# Patient Record
Sex: FEMALE | Race: WHITE | NOT HISPANIC OR LATINO | Employment: OTHER | URBAN - METROPOLITAN AREA
[De-identification: names, ages, dates, MRNs, and addresses within clinical notes are randomized per-mention and may not be internally consistent; named-entity substitution may affect disease eponyms.]

---

## 2018-07-16 ENCOUNTER — HOSPITAL ENCOUNTER (OUTPATIENT)
Dept: RADIOLOGY | Facility: HOSPITAL | Age: 63
Discharge: HOME | End: 2018-07-16
Attending: PHYSICIAN ASSISTANT
Payer: MEDICAID

## 2018-07-16 DIAGNOSIS — S52.501A DISTAL RADIUS FRACTURE, RIGHT: Primary | ICD-10-CM

## 2018-07-16 DIAGNOSIS — M79.604 RIGHT LEG PAIN: ICD-10-CM

## 2018-07-16 DIAGNOSIS — M67.01 CONTRACTURE OF RIGHT ACHILLES TENDON: Primary | ICD-10-CM

## 2018-07-16 DIAGNOSIS — M79.605 LEFT LEG PAIN: ICD-10-CM

## 2018-07-16 DIAGNOSIS — R20.0 NUMBNESS OF RIGHT LOWER EXTREMITY: ICD-10-CM

## 2018-07-16 DIAGNOSIS — M79.661 RIGHT CALF PAIN: ICD-10-CM

## 2018-07-16 DIAGNOSIS — M17.0 PRIMARY OSTEOARTHRITIS OF BOTH KNEES: ICD-10-CM

## 2018-07-16 DIAGNOSIS — S52.501A DISTAL RADIUS FRACTURE, RIGHT: ICD-10-CM

## 2018-07-16 PROCEDURE — 93970 EXTREMITY STUDY: CPT | Mod: 26,,, | Performed by: RADIOLOGY

## 2018-07-16 PROCEDURE — 93970 EXTREMITY STUDY: CPT | Mod: TC

## 2018-08-14 ENCOUNTER — CLINICAL SUPPORT (OUTPATIENT)
Dept: REHABILITATION | Facility: HOSPITAL | Age: 63
End: 2018-08-14
Payer: MEDICAID

## 2018-08-14 DIAGNOSIS — M25.561 CHRONIC PAIN OF BOTH KNEES: ICD-10-CM

## 2018-08-14 DIAGNOSIS — G89.29 CHRONIC PAIN OF BOTH KNEES: ICD-10-CM

## 2018-08-14 DIAGNOSIS — M25.562 CHRONIC PAIN OF BOTH KNEES: ICD-10-CM

## 2018-08-14 DIAGNOSIS — Z74.09 DECREASED MOBILITY AND ENDURANCE: ICD-10-CM

## 2018-08-14 DIAGNOSIS — M62.81 DECREASED MUSCLE STRENGTH: ICD-10-CM

## 2018-08-14 PROCEDURE — 97161 PT EVAL LOW COMPLEX 20 MIN: CPT | Mod: PN

## 2018-08-14 PROCEDURE — 97110 THERAPEUTIC EXERCISES: CPT | Mod: PN

## 2018-08-14 NOTE — PLAN OF CARE
TIME RECORD    Date: 8/14/2018    Start Time:  10:15  Stop Time:  11:00    PROCEDURES:    TIMED  Procedure Min.   TA 10                     UNTIMED  Procedure Min.   IE 35         Total Timed Minutes:  10  Total Timed Units:  1 TA  Total Untimed Units:  1 LCE  Charges Billed/# of units:  1 TE + 1 LCE  [facility]    PHYSICAL THERAPY EVALUATION  Onset Date: 2016  Medical Diagnosis:   M67.01 (ICD-10-CM) - Contracture of right Achilles tendon   R20.0 (ICD-10-CM) - Numbness of right lower extremity   M17.0 (ICD-10-CM) - Primary osteoarthritis of both knees   M79.661 (ICD-10-CM) - Right calf pain     Treatment Diagnosis:   1. Decreased muscle strength     2. Decreased mobility and endurance     3. Chronic pain of both knees       Physician: Park Wayne PA-C  No past medical history on file.  Precautions: R radial fracture in 2015, HTN  Prior Therapy: Yes for R hip pain  Medications: Mali Dias currently has no medications in their medication list.  Nutrition:  Obese  History of Present Illness: See subjective below  Prior Level of Function: Independent  Social History: No since 2010  Place of Residence (Steps/Adaptations): step down into dinning room for [facility]  Functional Deficits Leading to Referral/Nature of Injury: putting shoe on R foot, lifting/carrying, prolonged standing/walking, squatting  Patient Therapy Goals: Not have pain in my legs    Subjective     Mali Dias states pt reports that she is here for R leg and B knee pain in 2015 that got much better with therapy, but couldn't keep up with her HEP long term. Pt reports that her legs are very weak and painful.  Pain has been getting worse, and increases with weight bearing activities mainly with R medial calf and ankle pain. Pt reports her worse pain in her L knee, L hip and R medial ankle/calf. She also complains of numbness at times and sharp pain in her medial L ankle and calf that is off and on, and does not know what activity  causes it. Pain is usually better in the morning, but denies that pain gets worse in weightbearing activities. She says that when she stands up her R foot likes to turn in and has to put weight throughout it to straighten it out. She has received tests that rules out DVT.    Pain:  Location: L lateral/anterior hip, L anterior knee, L medial ankle rising proximally to L medial calf, and R knee pain   Description: Burning, Numb and Sharp  Activities Which Increase Pain: Sitting, Standing, Bending and Walking  Activities Which Decrease Pain: pain medication and rest  Pain Scale: 0/10 at best 8/10 now  10/10 at worst    Objective     Posture: anteriorly pelvic tilit, increased lumbar lordosis, min increased thoracic kyphosis, pes planus B  Palpation: +TTP at R lateral knee joint line, R medial calf, and L medial knee joint line  Sensation: light touch intact  DTRs: 3+ L4 B, 3+ S1 B  Clonus: 1 beat on R, normal on L  Range of Motion/Strength:   * = pain with testing  AROM: LUMBAR   Flexion 80%   Extension 80%   Right side bending 80%   Left side bending 80%   Right rotation 100%   Left rotation 100%     Hip  Right   Left  Pain/Dysfunction with Movement    AROM PROM MMT AROM PROM MMT    Flexion WFL WFL 4-/5 WFL WFL 4/5    Extension WFL WFL 2+/5 WFL WFL 2+/5    Abduction WFL WFL 3+/5 WFL WFL 4-/5    Adduction WFL WFL 4/5 WFL WFL 4/5    Internal rotation WFL WFL 4-/5 WFL WFL 4-/5    External rotation WFL WFL 4-/5 WFL WFL 4-/5       Knee  Right   Left  Pain/Dysfunction with Movement    AROM PROM MMT AROM PROM MMT    Flexion WFL WFL 4/5 WFL WFL 4+/5    Extension WFL WFL 4/5 WFL WFL 4/5      Ankle  Right   Left  Pain/Dysfunction with Movement    AROM PROM MMT AROM PROM MMT    Plantarflexion WFL WFL 4/5 WFL WFL 4/5    Dorsiflexion WFL WFL 4/5 WFL WFL 4/5    Inversion WFL WFL 4/5 WFL WFL 4/5    Eversion 75% WFL 4-/5 WFL WFL 4/5      Flexibility: decreased B HS length  Gait: With AD.  Device Used -  Cane  Analysis: decreased R  DF, decreased abhijeet, forward lean, decreased L step length  Bed Mobility:Independent  Transfers: Independent  Special Tests:   FADIR = positive on L, negative on R  LEONOR = negative on L with lateral hip pain down to knee, negative on L  Scour Test = negative B  Trendelenburg Sign = NT  Single Leg Bridge Test = NT  Anterior Drawer Test = negative B  Interdigital Neuroma Test = NT  Cummings test = negative B  Talar Tilit Test = NT  Navicular drop test = NT   (Difference of >10 mm is considered significant excessive foot pronation.)  Tinel's Sign Test (tarsal tunnel syndrome) = negative B  Jhoana's Test = NT  Mj's Compression Test [(+) B for pain increasing with knee extension] = NT  Edinson's Sign = NT  Patellar Grind Test = NT  Knee Varus Stress Test = negative B  Knee Valgus Stress Test = negative B  Lachman Drawer Test = NT  Anterior Drawer Test = NT  Pivot Shift test = NT  Shailesh's Test = NT  Apley's Test = NT  Severo Test = NT   J-Sign = NT    CMS Impairment/Limitation/Restriction for FOTO Lower Leg (w/o knee)Survey    Therapist reviewed FOTO scores for Mali Dias on 8/14/2018.   FOTO documents entered into [facility] - see Media section.    Limitation Score: 76%  Category: Mobility  Predicted: 59%       TREATMENT     Time In: 10:52  Time Out: 11:00    PT Evaluation Completed? Yes  Discussed Plan of Care with patient: Yes    Mali received 8 minutes of therapeutic activities & education including: role of PT and pt, prognosis, diagnosis, safe mobility with AD, possible causes to her problems, and importance of HEP.    GIVE HEP NEXT.  DATE 8/14/2018   VISIT 1   POC 10/14/18    G CODE 1/10   FOTO 1/5   Bike Next    MHP    MT    Stretches:    HS stretch    IT band stretch    Supine:    Quad sets Next B   SLR Next    SAQ    Knee flexion stretch    Heel slides    SL hip abd Next    Clams    Hip add Next    Bridges Next        Prone:    Quad stretch    HS curls        Seated:    LAQ Next    Marching Next        Standing:     Heel raises Next    Calf stretch on fitter Next    Steamboats Next    TKEs    SLS    Tandem walking    Cybex HS curls    Cybex LAQ    Leg Press Next    CP    INITIALS DAMARIS     Written Home Exercises Provided: Olga Lidia elder good understanding of the education provided.     See EMR in Patient Instructions for HEP given: not today      Assessment       Initial Assessment (Pertinent finding, problem list and factors affecting outcome):   Pt is a 62 yo female dx with Contracture of R achilles tendon, numbnes of RLE, Primary OA of B knees, and R calf pain presenting to PT at [facility]. Pt currently presents with B knee, L hip, and R medial calf pain, decreased lumbar ROM, decreased R ankle ROM, decreased BLE strength, poor posture, impaired balance and gait, and functional deficits with community walking and general ADLs.. Pt would benefit from skilled PT consisting of gait training, muscular skeletal stretching/strengthening, manual therapy, neuro muscular re-education, and modalities prn to address limitations and increase functional mobility.      History  Co-morbidities and personal factors that may impact the plan of care Co-morbidities:   arthritis, asthma, BMI over 30, HTN      Personal Factors:   no deficits     moderate   Examination  Body Structures and Functions, activity limitations and participation restrictions that may impact the plan of care Body Regions:   back  lower extremities  trunk    Body Systems:    gross symmetry  ROM  strength  gross coordinated movement  balance  gait  transfers  transitions  motor control    Participation Restrictions:   Community walking and standing activities    Activity limitations:   Learning and applying knowledge  no deficits    General Tasks and Commands  no deficits    Communication  no deficits    Mobility  lifting and carrying objects  walking    Self care  no deficits    Domestic Life  shopping  cooking  doing house work (cleaning  house, washing dishes, laundry)  assisting others    Interactions/Relationships  no deficits    Life Areas  no deficits    Community and Social Life  no deficits         high   Clinical Presentation stable and uncomplicated low   Decision Making/ Complexity Score: low       Rehab Potiential: excellent  Spiritual/Cultural Needs: None  Barriers to Rehab: None  GOALS: Short Term Goals:  4 weeks  1. Pt will demo good seated and standing posture.  2. Increase strength by 1/3 MMT grade in BLE  to increase tolerance for ADL and work activities.  3. Pt to tolerate HEP to improve ROM and independence with ADL's.    Long Term Goals: 8 weeks  1.Report decreased B knee, L hip, and R calf pain </= 3/10  to increase tolerance for ADLs.  2.Patient goal: Not have pain in my legs  3.Increase strength to >/= 4+/5 in BLE to increase tolerance for ADL and work activities.  4. Pt will report at </=59% impaired on KNEE FOTO to demonstrate increase in LE function with every day tasks.   5. Pt will negotiate 8 steps and ambulate community distances at >/= Mod I for increased functional mobility.  6. Increase knee and lumbar ROM to WNL in order to be able to perform ADLs without difficulty.  7. Pt will ambulate with min gait deviations and without AD at home for increased functional mobility.      Plan     Certification Period: 8/14/18 to 10/14/18  Recommended Treatment Plan: 2 times per week for 8 weeks: Aquatic Therapy, Cervical/Lumbar Traction, Electrical Stimulation IFC/Russian, Gait Training, Manual Therapy, Moist Heat/ Ice, Neuromuscular Re-ed, Orthotic Management and Training, Patient Education, Self Care, Therapeutic Activites and Therapeutic Exercise  Other Recommendations: modalities prn, ASTYM prn, kinesiotape prn, Functional Dry Needling prn       Cristobal Salamanca, PT  8/14/2018      I CERTIFY THE NEED FOR THESE SERVICES FURNISHED UNDER THIS PLAN OF TREATMENT AND WHILE UNDER MY CARE    Physician's comments:  ________________________________________________________________________________________________________________________________________________      Physician's Name: ___________________________________

## 2018-08-15 PROBLEM — Z74.09 DECREASED MOBILITY AND ENDURANCE: Status: ACTIVE | Noted: 2018-08-15

## 2018-08-15 PROBLEM — G89.29 CHRONIC PAIN OF BOTH KNEES: Status: ACTIVE | Noted: 2018-08-15

## 2018-08-15 PROBLEM — M25.561 CHRONIC PAIN OF BOTH KNEES: Status: ACTIVE | Noted: 2018-08-15

## 2018-08-15 PROBLEM — M25.562 CHRONIC PAIN OF BOTH KNEES: Status: ACTIVE | Noted: 2018-08-15

## 2018-08-15 PROBLEM — M62.81 DECREASED MUSCLE STRENGTH: Status: ACTIVE | Noted: 2018-08-15

## 2018-09-05 ENCOUNTER — DOCUMENTATION ONLY (OUTPATIENT)
Dept: REHABILITATION | Facility: HOSPITAL | Age: 63
End: 2018-09-05

## 2018-09-05 ENCOUNTER — CLINICAL SUPPORT (OUTPATIENT)
Dept: REHABILITATION | Facility: HOSPITAL | Age: 63
End: 2018-09-05
Payer: MEDICAID

## 2018-09-05 DIAGNOSIS — M25.561 CHRONIC PAIN OF BOTH KNEES: ICD-10-CM

## 2018-09-05 DIAGNOSIS — Z74.09 DECREASED MOBILITY AND ENDURANCE: ICD-10-CM

## 2018-09-05 DIAGNOSIS — M62.81 DECREASED MUSCLE STRENGTH: ICD-10-CM

## 2018-09-05 DIAGNOSIS — G89.29 CHRONIC PAIN OF BOTH KNEES: ICD-10-CM

## 2018-09-05 DIAGNOSIS — M25.562 CHRONIC PAIN OF BOTH KNEES: ICD-10-CM

## 2018-09-05 PROCEDURE — 97140 MANUAL THERAPY 1/> REGIONS: CPT | Mod: PN

## 2018-09-05 PROCEDURE — 97110 THERAPEUTIC EXERCISES: CPT | Mod: PN

## 2018-09-05 NOTE — PROGRESS NOTES
DAILY TREATMENT NOTE    DATE: 9/5/2018    Start Time:  8:00  Stop Time:  9:00    PROCEDURES:    TIMED  Procedure Min.   TE 20   MT 10                 UNTIMED  Procedure Min.   Bike 5   Supervised TE 25     Total Timed Minutes: 30  Total Timed Units:  2  Total Untimed Units:  0  Charges Billed/# of units:  2 TE      Progress/Current Status    Subjective:     Patient ID: Mali Dias is a 63 y.o. female.  Diagnosis:   1. Decreased muscle strength     2. Decreased mobility and endurance     3. Chronic pain of both knees       Pain: 7 /10  Pt reports L medial knee is where most of her pain is along with some general anterior R knee pain. No numbness in L leg, just L knee stiffness.    Objective:     Pt performed bike x 5 mins, and then supervised TE x 25 mins per log below. Pt then received MT x 10 mins consisting B patellar mobs Grade III and Long axis hip distraction B Grade III. Pt then performed TE x 20 mins per log below 1:1 with PT.    DATE 9/5/18 8/14/2018   VISIT 2 1   POC 10/14/18      G CODE 2/10 1/10   FOTO 2/5 1/5   Bike 5' Next    MHP      MT      Stretches:      HS stretch 3x30'' B     IT band stretch      Supine:      Quad sets 2x10 5'' holds Next B   SLR 2x10 B Next    SAQ 2x15 B  D/c next     Knee flexion stretch      Heel slides      SL hip abd 2x10 B Next    Clams Next      Hip add 15x5'' w/ ball Next    Bridges 2x10 Next           Prone:      Quad stretch      HS curls             Seated:      LAQ 2x10 B Next    Marching 2x10 B Next           Standing:      Heel raises 2x10 DL Next    Calf stretch on fitter 3x30'' DL Next    Steamboats 2x10 B Next    TKEs Next      Step ups 2x10 B L1  1 HR    Step downs     SLS      Tandem walking      Cybex HS curls      Cybex LAQ      Leg Press  Next    CP      INITIALS DAMARIS DAMARIS       Assessment:     Pt performed all TE well with only min increased B knee pain at times. Pt reported some medial lower left leg pain initially during step ups tat did not increase. Pt  very TTP at medial L knee, distal hip adductors, L distal IT band, and R lateral quad. Pt given HEP and pt scheduled more visits today. Pt demo increased fatigue after session today.    Patient Education/Response:     Cont HEP    Plans and Goals:     Cont per POC, progress per pt tolerance.    GOALS: Short Term Goals:  4 weeks  1. Pt will demo good seated and standing posture.  2. Increase strength by 1/3 MMT grade in BLE  to increase tolerance for ADL and work activities.  3. Pt to tolerate HEP to improve ROM and independence with ADL's.     Long Term Goals: 8 weeks  1.Report decreased B knee, L hip, and R calf pain </= 3/10  to increase tolerance for ADLs.  2.Patient goal: Not have pain in my legs  3.Increase strength to >/= 4+/5 in BLE to increase tolerance for ADL and work activities.  4. Pt will report at </=59% impaired on KNEE FOTO to demonstrate increase in LE function with every day tasks.   5. Pt will negotiate 8 steps and ambulate community distances at >/= Mod I for increased functional mobility.  6. Increase knee and lumbar ROM to WNL in order to be able to perform ADLs without difficulty.  7. Pt will ambulate with min gait deviations and without AD at home for increased functional mobility.

## 2018-09-05 NOTE — PROGRESS NOTES
Face to Face PTA Conference performed with  Lela Link PTA, Thi Shah PTA, Ralph Shukla PTA Angelo Brady PTA regarding patient's current status, overall progress, and plan of care    Cristobal Salamanca, PT     Face to face meeting completed with Cristobal Salamanca PT regarding current status and progress of   Mali Mahomet .  Angelo Brady, PTA    Face to face meeting completed with Cristobal Salamanca PT regarding current status and progress of   Mali Mahomet .  Lela Link PTA    Face to face meeting completed with Cristobal Salamanca, PT regarding current status and progress of   Mali Mahomet .  Thi Shah PTA

## 2018-09-07 ENCOUNTER — CLINICAL SUPPORT (OUTPATIENT)
Dept: REHABILITATION | Facility: HOSPITAL | Age: 63
End: 2018-09-07
Payer: MEDICAID

## 2018-09-07 DIAGNOSIS — G89.29 CHRONIC PAIN OF BOTH KNEES: ICD-10-CM

## 2018-09-07 DIAGNOSIS — M62.81 DECREASED MUSCLE STRENGTH: ICD-10-CM

## 2018-09-07 DIAGNOSIS — M25.562 CHRONIC PAIN OF BOTH KNEES: ICD-10-CM

## 2018-09-07 DIAGNOSIS — M25.561 CHRONIC PAIN OF BOTH KNEES: ICD-10-CM

## 2018-09-07 DIAGNOSIS — Z74.09 DECREASED MOBILITY AND ENDURANCE: ICD-10-CM

## 2018-09-07 PROCEDURE — 97110 THERAPEUTIC EXERCISES: CPT | Mod: PN

## 2018-09-07 NOTE — PROGRESS NOTES
DAILY TREATMENT NOTE    DATE: 9/7/2018    Start Time:  9:05  Stop Time:  10:00    PROCEDURES:    TIMED  Procedure Min.   TE 15   MT                  UNTIMED  Procedure Min.   Bike 5   Supervised TE 35     Total Timed Minutes: 15  Total Timed Units:  1  Total Untimed Units:  0  Charges Billed/# of units:  1 TE      Progress/Current Status    Subjective:     Patient ID: Mali Dias is a 63 y.o. female.  Diagnosis:   1. Decreased muscle strength     2. Decreased mobility and endurance     3. Chronic pain of both knees       Pain: 6/10  Pt reports no numbness and mainly L medial knee aching    Objective:     Pt performed bike x 5 mins, and then supervised TE x 35 mins per log below. Pt then received TE 1:1 with PT x 15 mins per log below. No manual hip/knee distraction today.    DATE 9/7/18 9/5/18 8/14/2018   VISIT 3 2 1   POC 10/14/18       G CODE 3/10 2/10 1/10   FOTO 3/5 2/5 1/5   Bike 5' 5' Next    MHP       MT       Stretches:       HS stretch 3x30'' B 3x30'' B     IT band stretch       Supine:       Quad sets 2x10 B 5'' holds 2x10 5'' holds Next B   SLR 2x10 B 2x10 B Next    SAQ D/c 2x15 B  D/c next     Knee flexion stretch       Heel slides       SL hip abd 2x10 B 2x10 B Next    Clams 2x10 B RTB Next      Hip add 15x5'' w/ ball 15x5'' w/ ball Next    Bridges 3x10 2x10 Next            Prone:       Quad stretch Next       HS curls               Seated:       LAQ 2x10 B 1# 2x10 B Next    Marching 2x10 B 1# 2x10 B Next            Standing:       Heel raises 2x10 2x10 DL Next    Calf stretch on fitter 3x30'' DL 3x30'' DL Next    Steamboats 2x10 B RTC 2x10 B Next    TKEs 15x5'' B Next      Step ups 2x10 B L1 2x10 B L1  1 HR    Step downs Next      SLS       Tandem walking       Cybex HS curls 2x10 DL  3.0      Cybex LAQ       Leg Press Next   Next    CP       INITIALS DAMARIS DAMARIS DAMARIS       Assessment:     Pt performed well today, and reported decreased pain in B knees after session. Cont TE and perform MT as  needed.    Patient Education/Response:     Cont HEP    Plans and Goals:     Cont per POC, progress per pt tolerance.    GOALS: Short Term Goals:  4 weeks  1. Pt will demo good seated and standing posture.  2. Increase strength by 1/3 MMT grade in BLE  to increase tolerance for ADL and work activities.  3. Pt to tolerate HEP to improve ROM and independence with ADL's.     Long Term Goals: 8 weeks  1.Report decreased B knee, L hip, and R calf pain </= 3/10  to increase tolerance for ADLs.  2.Patient goal: Not have pain in my legs  3.Increase strength to >/= 4+/5 in BLE to increase tolerance for ADL and work activities.  4. Pt will report at </=59% impaired on KNEE FOTO to demonstrate increase in LE function with every day tasks.   5. Pt will negotiate 8 steps and ambulate community distances at >/= Mod I for increased functional mobility.  6. Increase knee and lumbar ROM to WNL in order to be able to perform ADLs without difficulty.  7. Pt will ambulate with min gait deviations and without AD at home for increased functional mobility.

## 2018-09-11 ENCOUNTER — CLINICAL SUPPORT (OUTPATIENT)
Dept: REHABILITATION | Facility: HOSPITAL | Age: 63
End: 2018-09-11
Payer: MEDICAID

## 2018-09-11 DIAGNOSIS — G89.29 CHRONIC PAIN OF BOTH KNEES: ICD-10-CM

## 2018-09-11 DIAGNOSIS — M62.81 DECREASED MUSCLE STRENGTH: ICD-10-CM

## 2018-09-11 DIAGNOSIS — M25.561 CHRONIC PAIN OF BOTH KNEES: ICD-10-CM

## 2018-09-11 DIAGNOSIS — Z74.09 DECREASED MOBILITY AND ENDURANCE: ICD-10-CM

## 2018-09-11 DIAGNOSIS — M25.562 CHRONIC PAIN OF BOTH KNEES: ICD-10-CM

## 2018-09-11 PROCEDURE — 97110 THERAPEUTIC EXERCISES: CPT | Mod: PN

## 2018-09-11 PROCEDURE — 97140 MANUAL THERAPY 1/> REGIONS: CPT | Mod: PN

## 2018-09-11 NOTE — PROGRESS NOTES
DAILY TREATMENT NOTE    DATE: 9/11/2018    Start Time:  10:00  Stop Time:  11:00    PROCEDURES:    TIMED  Procedure Min.   TE 45   MT 10                 UNTIMED  Procedure Min.   Bike 5   Supervised TE      Total Timed Minutes: 55  Total Timed Units:  4  Total Untimed Units:  0  Charges Billed/# of units:  4 TE      Progress/Current Status    Subjective:     Patient ID: Mali Dias is a 63 y.o. female.  Diagnosis:   1. Decreased muscle strength     2. Decreased mobility and endurance     3. Chronic pain of both knees       Pain: 7/10  Pt reports that she had increased R medial lower leg and foot pain after last visit at 8-9/10. Pain is back at level it was before, but is off and on.     Objective:     Pt performed bike x 5 mins, and then received MT x 10 mins consisting of B hip long axis distraction grade III. Pt then received TE 1:1 with PT x 15 mins per log below.    DATE 9/11/18 9/7/18 9/5/18 8/14/2018   VISIT 4 3 2 1   POC 10/14/18        G CODE 4/10 3/10 2/10 1/10   FOTO 4/5 3/5 2/5 1/5   Bike 5' 5' 5' Next    MHP        MT        Stretches:        HS stretch 2x45'' B 3x30'' B 3x30'' B     IT band stretch        Supine:        Quad sets NT 2x10 B 5'' holds 2x10 5'' holds Next B   SLR 3x10 B 2x10 B 2x10 B Next    SAQ  D/c 2x15 B  D/c next     Knee flexion stretch        Heel slides        SL hip abd 2x15 B 2x10 B 2x10 B Next    Clams Next  2x10 B RTB Next      Hip add 15x5''  D/c next 15x5'' w/ ball 15x5'' w/ ball Next    Bridges 2x15  3x10 2x10 Next             Prone:        Quad stretch Next  Next       HS curls                 Seated:        LAQ -- 2x10 B 1# 2x10 B Next    Marching -- 2x10 B 1# 2x10 B Next             Standing:        Heel raises 3x10 2x10 2x10 DL Next    Calf stretch on fitter 3x30'' DL 3x30'' DL 3x30'' DL Next    Steamboats NT 2x10 B RTC 2x10 B Next    TKEs  15x5'' B Next      Step ups Hold for now 2x10 B L1 2x10 B L1  1 HR    Step downs Hold for now Next      SLS        Tandem walking         Cybex HS curls NT 2x10 DL  3.0      Cybex LAQ        Leg Press 3x10 DL  4 plates Next   Next    CP        INITIALS DAMARIS DAMARIS DAMARIS DAMARIS      Gait: decreased L step length, decreased abhijeet   Palpation: pt exceptionally sensitive and tender at R pes anserinus, along R medial calf, R medial/anterior leg  Babinski reflex: appears to be negative on R  Clonus: positive on R with 1-2 beats, negative on L  R ankle strength: pt presents with myotomal weakness and unable to sustain contraction past 1 second.  DF: 2+/5  PF: 2+/5  Inv: 2+/5  Evr: 2+/5    Assessment:     Pt reported decreased BLE pain after session and only had one instance of R medial lower leg and ankle pain after weight bearing in standing from getting off leg press. Progress TE per pt tolerance; some exercises skipped today to assess if therapy was causing increased pain the day after. Diagnosis is still in the making was cause of R lower leg pain; perform lumbar testing and ROM lateral for further assessment if needed.    Patient Education/Response:     Cont HEP    Plans and Goals:     Cont per POC, progress per pt tolerance.    GOALS: Short Term Goals:  4 weeks  1. Pt will demo good seated and standing posture.  2. Increase strength by 1/3 MMT grade in BLE  to increase tolerance for ADL and work activities.  3. Pt to tolerate HEP to improve ROM and independence with ADL's.     Long Term Goals: 8 weeks  1.Report decreased B knee, L hip, and R calf pain </= 3/10  to increase tolerance for ADLs.  2.Patient goal: Not have pain in my legs  3.Increase strength to >/= 4+/5 in BLE to increase tolerance for ADL and work activities.  4. Pt will report at </=59% impaired on KNEE FOTO to demonstrate increase in LE function with every day tasks.   5. Pt will negotiate 8 steps and ambulate community distances at >/= Mod I for increased functional mobility.  6. Increase knee and lumbar ROM to WNL in order to be able to perform ADLs without difficulty.  7. Pt will  ambulate with min gait deviations and without AD at home for increased functional mobility.

## 2018-09-13 ENCOUNTER — CLINICAL SUPPORT (OUTPATIENT)
Dept: REHABILITATION | Facility: HOSPITAL | Age: 63
End: 2018-09-13
Payer: MEDICAID

## 2018-09-13 DIAGNOSIS — Z74.09 DECREASED MOBILITY AND ENDURANCE: ICD-10-CM

## 2018-09-13 DIAGNOSIS — M25.562 CHRONIC PAIN OF BOTH KNEES: ICD-10-CM

## 2018-09-13 DIAGNOSIS — M25.561 CHRONIC PAIN OF BOTH KNEES: ICD-10-CM

## 2018-09-13 DIAGNOSIS — G89.29 CHRONIC PAIN OF BOTH KNEES: ICD-10-CM

## 2018-09-13 DIAGNOSIS — M62.81 DECREASED MUSCLE STRENGTH: ICD-10-CM

## 2018-09-13 PROCEDURE — 97110 THERAPEUTIC EXERCISES: CPT | Mod: PN

## 2018-09-13 NOTE — PROGRESS NOTES
"DAILY TREATMENT NOTE    DATE: 9/13/2018    Start Time:  8:06 am  Stop Time:  8:59 am     PROCEDURES:    TIMED  Procedure Min.   TE supervised 24' NC   TE 29'                  UNTIMED  Procedure Min.             Total Timed Minutes:  29'   Total Timed Units:  2  Total Untimed Units:  0  Charges Billed/# of units:  2 TE      Progress/Current Status    Subjective:     Patient ID: Mali Dias is a 63 y.o. female.  Diagnosis:   1. Decreased muscle strength     2. Decreased mobility and endurance     3. Chronic pain of both knees       Pain: 3 /10  Pt stated that her pain is not bad at all today.  Pt stated that she did not experience increased pain after last therapy session and did not have to put on cream.     Objective:     Pt participated in therex per log below supervised x 24' and 1:1 with PT x 29'.     DATE 9/13/18 9/11/18 9/7/18 9/5/18 8/14/2018   VISIT 5 4 3 2 1   POC 10/14/18         G CODE 5/10 4/10 3/10 2/10 1/10   FOTO 5/5 4/5 3/5 2/5 1/5   Bike  5' 5' 5' Next    MHP         MT         Stretches:         HS stretch 2x45"  2x45'' B 3x30'' B 3x30'' B     IT band stretch         Supine:         Quad sets  NT 2x10 B 5'' holds 2x10 5'' holds Next B   SLR 3x10 B 3x10 B 2x10 B 2x10 B Next    SAQ   D/c 2x15 B  D/c next     Knee flexion stretch         Heel slides         SL hip abd 2x15 B 2x15 B 2x10 B 2x10 B Next    Clams 2x10 RTB B Next  2x10 B RTB Next      Hip add - 15x5''  D/c next 15x5'' w/ ball 15x5'' w/ ball Next    Bridges 2x15 2x15  3x10 2x10 Next              Prone:         Quad stretch 3x30"  Next  Next       HS curls                   Seated:         LAQ  -- 2x10 B 1# 2x10 B Next    Marching  -- 2x10 B 1# 2x10 B Next              Standing:         Heel raises 3x10 3x10 2x10 2x10 DL Next    Calf stretch on fitter 3x30" DL 3x30'' DL 3x30'' DL 3x30'' DL Next    Steamboats 2x10 B RTC NT 2x10 B RTC 2x10 B Next    TKEs   15x5'' B Next      Step ups - Hold for now 2x10 B L1 2x10 B L1  1 HR    Step downs - Hold " for now Next      SLS         Tandem walking         Cybex HS curls  NT 2x10 DL  3.0      Cybex LAQ         Leg Press 3x10 DL 4.0  3x10 DL  4 plates Next   Next    CP         INITIALS CK DAMARIS OCAMPO       Assessment:     Pt was able to tolerate all therex noted per log above including additional therex noted per log without c/o increased pain. Pt reported no increased pain at the end of therapy session. Continue to monitor response to therapy session and progress as tolerated.     Patient Education/Response:     Continue with HEP as tolerated    Plans and Goals:     Cont per POC, progress per pt tolerance.    GOALS: Short Term Goals:  4 weeks  1. Pt will demo good seated and standing posture.  2. Increase strength by 1/3 MMT grade in BLE  to increase tolerance for ADL and work activities.  3. Pt to tolerate HEP to improve ROM and independence with ADL's.     Long Term Goals: 8 weeks  1.Report decreased B knee, L hip, and R calf pain </= 3/10  to increase tolerance for ADLs.  2.Patient goal: Not have pain in my legs  3.Increase strength to >/= 4+/5 in BLE to increase tolerance for ADL and work activities.  4. Pt will report at </=59% impaired on KNEE FOTO to demonstrate increase in LE function with every day tasks.   5. Pt will negotiate 8 steps and ambulate community distances at >/= Mod I for increased functional mobility.  6. Increase knee and lumbar ROM to WNL in order to be able to perform ADLs without difficulty.  7. Pt will ambulate with min gait deviations and without AD at home for increased functional mobility.

## 2018-09-17 NOTE — PROGRESS NOTES
"  Physical Therapy Daily Treatment Note     Name: Mali Dias  Clinic Number: [id number]    Therapy Diagnosis: No diagnosis found.  Physician: Park Wayne PA-C    Visit Date: 9/18/2018    Physician Orders: PT eval and treat  Medical Diagnosis:   M67.01 (ICD-10-CM) - Contracture of right Achilles tendon   R20.0 (ICD-10-CM) - Numbness of right lower extremity   M17.0 (ICD-10-CM) - Primary osteoarthritis of both knees   M79.661 (ICD-10-CM) - Right calf pain       Evaluation Date: 8/14/18  Authorization Period Expiration: 10/05/18  Plan of Care Certification Period: 8/14/18 to 10/14/18  Visit #/Visits authorized: ***/ 12     Time In: ***  Time Out: ***  Total Billable Time: *** minutes    Precautions: R radial fracture in 2015, HTN    Subjective     Pt reports: ***.  She {Actions; was/was not:53059} compliant with home exercise program.  Response to previous treatment: ***  Functional change: ***    Pain: {0-10:57555::"0"}/10  Location: {RIGHT/LEFT/BILATERAL:51360} {LOCATION ON BODY:37848}     Objective     MALI received therapeutic exercises to develop {AMB PT PROGRESS OBJECTIVE:16027} for *** minutes including:  ***  DATE 9/13/18 9/11/18 9/7/18 9/5/18 8/14/2018   VISIT 5 4 3 2 1   POC 10/14/18             G CODE 5/10 4/10 3/10 2/10 1/10   FOTO 5/5 4/5 3/5 2/5 1/5   Bike   5' 5' 5' Next    MHP             MT             Stretches:             HS stretch 2x45"  2x45'' B 3x30'' B 3x30'' B     IT band stretch             Supine:             Quad sets   NT 2x10 B 5'' holds 2x10 5'' holds Next B   SLR 3x10 B 3x10 B 2x10 B 2x10 B Next    SAQ     D/c 2x15 B  D/c next     Knee flexion stretch             Heel slides             SL hip abd 2x15 B 2x15 B 2x10 B 2x10 B Next    Clams 2x10 RTB B Next  2x10 B RTB Next      Hip add - 15x5''  D/c next 15x5'' w/ ball 15x5'' w/ ball Next    Bridges 2x15 2x15  3x10 2x10 Next                  Prone:             Quad stretch 3x30"  Next  Next        HS curls                         " "  Seated:             LAQ   -- 2x10 B 1# 2x10 B Next    Marching   -- 2x10 B 1# 2x10 B Next                  Standing:             Heel raises 3x10 3x10 2x10 2x10 DL Next    Calf stretch on fitter 3x30" DL 3x30'' DL 3x30'' DL 3x30'' DL Next    Steamboats 2x10 B RTC NT 2x10 B RTC 2x10 B Next    TKEs     15x5'' B Next      Step ups - Hold for now 2x10 B L1 2x10 B L1  1 HR     Step downs - Hold for now Next        SLS             Tandem walking             Cybex HS curls   NT 2x10 DL  3.0       Cybex LAQ             Leg Press 3x10 DL 4.0  3x10 DL  4 plates Next    Next    CP             INITIALS OSVALDO OCAMPO             MARTHA received the following manual therapy techniques: {AMB PT PROGRESS MANUAL THERAPY:09656} were applied to the: *** for *** minutes, including:  ***      Home Exercises Provided and Patient Education Provided     Education provided:   - ***    Written Home Exercises Provided: {Blank single:66372::"yes","Patient instructed to cont prior HEP"}.  Exercises were reviewed and MARTHA was able to demonstrate them prior to the end of the session.  MARTHA demonstrated {Desc; good/fair/poor:62723} understanding of the education provided.     See EMR under {Blank single:02395::"Media","Patient Instructions"} for exercises provided {Blank single:43162::"9/18/2018","prior visit"}.    Assessment     ***  MARTHA {IS/IS NOT:28936} progressing well towards her goals.   Pt prognosis is {REHAB PROGNOSIS OHS:18638}.     Pt will continue to benefit from skilled outpatient physical therapy to address the deficits listed in the problem list box on initial evaluation, provide pt/family education and to maximize pt's level of independence in the home and community environment.     Pt's spiritual, cultural and educational needs considered and pt agreeable to plan of care and goals.    Anticipated barriers to physical therapy: none    Goals:   Short Term Goals:  4 weeks  1. Pt will demo good seated and standing posture.  2. " Increase strength by 1/3 MMT grade in BLE  to increase tolerance for ADL and work activities.  3. Pt to tolerate HEP to improve ROM and independence with ADL's.     Long Term Goals: 8 weeks  1.Report decreased B knee, L hip, and R calf pain </= 3/10  to increase tolerance for ADLs.  2.Patient goal: Not have pain in my legs  3.Increase strength to >/= 4+/5 in BLE to increase tolerance for ADL and work activities.  4. Pt will report at </=59% impaired on KNEE FOTO to demonstrate increase in LE function with every day tasks.   5. Pt will negotiate 8 steps and ambulate community distances at >/= Mod I for increased functional mobility.  6. Increase knee and lumbar ROM to WNL in order to be able to perform ADLs without difficulty.  7. Pt will ambulate with min gait deviations and without AD at home for increased functional mobility.    Plan     ***    Lela Link, PTA

## 2018-09-18 ENCOUNTER — CLINICAL SUPPORT (OUTPATIENT)
Dept: REHABILITATION | Facility: HOSPITAL | Age: 63
End: 2018-09-18
Payer: MEDICAID

## 2018-09-18 DIAGNOSIS — Z74.09 DECREASED MOBILITY AND ENDURANCE: ICD-10-CM

## 2018-09-18 DIAGNOSIS — G89.29 CHRONIC PAIN OF BOTH KNEES: ICD-10-CM

## 2018-09-18 DIAGNOSIS — M62.81 DECREASED MUSCLE STRENGTH: ICD-10-CM

## 2018-09-18 DIAGNOSIS — M25.561 CHRONIC PAIN OF BOTH KNEES: ICD-10-CM

## 2018-09-18 DIAGNOSIS — M25.562 CHRONIC PAIN OF BOTH KNEES: ICD-10-CM

## 2018-09-18 PROCEDURE — 97110 THERAPEUTIC EXERCISES: CPT | Mod: PN

## 2018-09-18 NOTE — PROGRESS NOTES
"DAILY TREATMENT NOTE    DATE: 9/18/2018    Start Time:  0855  Stop Time:  0950    PROCEDURES:    TIMED  Procedure Min.   TE 38   Supervised TE 17                 UNTIMED  Procedure Min.             Total Timed Minutes: 38   Total Timed Units:  3  Total Untimed Units:  --  Charges Billed/# of units:  3 TE      Progress/Current Status    Subjective:     Patient ID: Mali Dias is a 63 y.o. female.  Diagnosis:   1. Decreased muscle strength     2. Decreased mobility and endurance     3. Chronic pain of both knees       Pain: 0 /10  Pt stated that she was hurting yesterday due to stress and walking b/c her brother is in ICU but she stated that "since he got better I got better too".      Objective:     Pt participated in therex per log below with PT 1:1 x 38' f/b supervised x 17'.    DATE 9/18/18 9/13/18 9/11/18 9/7/18 9/5/18 8/14/2018   VISIT 6 5 4 3 2 1   POC 10/14/18          G CODE 6/10 5/10 4/10 3/10 2/10 1/10   FOTO Done 6/10 5/5 4/5 3/5 2/5 1/5   Bike 5'  5' 5' 5' Next    MHP --         MT --         Stretches:          HS stretch 3 x 30'' 2x45"  2x45'' B 3x30'' B 3x30'' B     IT band stretch          Supine:          Quad sets 2x10 B 5'' holds  NT 2x10 B 5'' holds 2x10 5'' holds Next B   SLR 3x10 B 3x10 B 3x10 B 2x10 B 2x10 B Next    SAQ --   D/c 2x15 B  D/c next     Knee flexion stretch --         Heel slides --         SL hip abd 2x15 B 2x15 B 2x15 B 2x10 B 2x10 B Next    Clams 3x10 RTB B 2x10 RTB B Next  2x10 B RTB Next      Hip add -- - 15x5''  D/c next 15x5'' w/ ball 15x5'' w/ ball Next    Bridges 2x15 2x15 2x15  3x10 2x10 Next      --         Prone: --         Quad stretch 3x30"  3x30"  Next  Next       HS curls --                    Seated:          LAQ --  -- 2x10 B 1# 2x10 B Next    Marching --  -- 2x10 B 1# 2x10 B Next               Standing:          Heel raises 3x10 3x10 3x10 2x10 2x10 DL Next    Calf stretch on fitter 3x30" DL on fitter 3x30" DL 3x30'' DL 3x30'' DL 3x30'' DL Next    Steamboats 2x10 " B RTC 2x10 B RTC NT 2x10 B RTC 2x10 B Next    TKEs ---   15x5'' B Next      Step ups declined - Hold for now 2x10 B L1 2x10 B L1  1 HR    Step downs declined - Hold for now Next      SLS --         Tandem walking --         Cybex HS curls 2x10 DL  3.0  NT 2x10 DL  3.0      Cybex LAQ --         Leg Press 3x10 DL 4.0 w/ ball squeeze 3x10 DL 4.0  3x10 DL  4 plates Next   Next    CP --         INITIALS FS CK DAMARIS DAMARIS DAMARIS DAMARIS       Assessment:     Pt will cont to benefit from TE to increase hip strength and LE stability.  Pt still feels that step ups and step downs cause pain and she declines these exercises.      Patient Education/Response:     CONT HEP    Plans and Goals:     Cont per POC, progress per pt tolerance.    GOALS: Short Term Goals:  4 weeks  1. Pt will demo good seated and standing posture.  2. Increase strength by 1/3 MMT grade in BLE  to increase tolerance for ADL and work activities.  3. Pt to tolerate HEP to improve ROM and independence with ADL's.     Long Term Goals: 8 weeks  1.Report decreased B knee, L hip, and R calf pain </= 3/10  to increase tolerance for ADLs.  2.Patient goal: Not have pain in my legs  3.Increase strength to >/= 4+/5 in BLE to increase tolerance for ADL and work activities.  4. Pt will report at </=59% impaired on KNEE FOTO to demonstrate increase in LE function with every day tasks.   5. Pt will negotiate 8 steps and ambulate community distances at >/= Mod I for increased functional mobility.  6. Increase knee and lumbar ROM to WNL in order to be able to perform ADLs without difficulty.  7. Pt will ambulate with min gait deviations and without AD at home for increased functional mobility.

## 2018-09-21 ENCOUNTER — CLINICAL SUPPORT (OUTPATIENT)
Dept: REHABILITATION | Facility: HOSPITAL | Age: 63
End: 2018-09-21
Payer: MEDICAID

## 2018-09-21 DIAGNOSIS — G89.29 CHRONIC PAIN OF BOTH KNEES: ICD-10-CM

## 2018-09-21 DIAGNOSIS — M62.81 DECREASED MUSCLE STRENGTH: ICD-10-CM

## 2018-09-21 DIAGNOSIS — M25.561 CHRONIC PAIN OF BOTH KNEES: ICD-10-CM

## 2018-09-21 DIAGNOSIS — M25.562 CHRONIC PAIN OF BOTH KNEES: ICD-10-CM

## 2018-09-21 DIAGNOSIS — Z74.09 DECREASED MOBILITY AND ENDURANCE: ICD-10-CM

## 2018-09-21 PROCEDURE — 97110 THERAPEUTIC EXERCISES: CPT | Mod: PN

## 2018-09-21 NOTE — PROGRESS NOTES
"DAILY TREATMENT NOTE    DATE: 9/21/2018    Start Time:  900  Stop Time:  1000    PROCEDURES:    TIMED  Procedure Min.   Therex 40                 UNTIMED  Procedure Min.   Bike 5         Total Timed Minutes:  40  Total Timed Units:  3  Total Untimed Units:  0  Charges Billed/# of units:  3  TE      Progress/Current Status    Subjective:     Patient ID: Mali Dias is a 63 y.o. female.  Diagnosis:   1. Decreased muscle strength     2. Decreased mobility and endurance     3. Chronic pain of both knees       Patient voiced no complaints of pain.  States she is using less pain cream now.    Objective:     Patient performed all therex per log below with PTA x 40 min, remainder with supervision.  Increased reps and added trial with weight today.    DATE 9/21/18 9/18/18 9/13/18 9/11/18 9/7/18 9/5/18 8/14/2018   VISIT 7 6 5 4 3 2 1   POC   10/14/18           FOTO 7/10 Done 6/10 5/5 4/5 3/5 2/5 1/5   Bike 5' 5'  5' 5' 5' Next    MHP -- --         MT -- --         Stretches:           HS stretch 30"x3 B 3 x 30'' 2x45"  2x45'' B 3x30'' B 3x30'' B     IT band stretch           Supine:           Quad sets 5" hold  2x10 2x10 B 5'' holds  NT 2x10 B 5'' holds 2x10 5'' holds Next B   SLR 2x15 3x10 B 3x10 B 3x10 B 2x10 B 2x10 B Next    SAQ  --   D/c 2x15 B  D/c next     Knee flexion stretch  --         Heel slides  --         SL hip abd 1# 2x10 B 2x15 B 2x15 B 2x15 B 2x10 B 2x10 B Next    Clams RTB   2x15 B 3x10 RTB B 2x10 RTB B Next  2x10 B RTB Next      Hip add  -- - 15x5''  D/c next 15x5'' w/ ball 15x5'' w/ ball Next    Bridges 2x15 2x15 2x15 2x15  3x10 2x10 Next       --         Prone:  --         Quad stretch 30"x3 B 3x30"  3x30"  Next  Next       HS curls  --                     Seated:           LAQ -- --  -- 2x10 B 1# 2x10 B Next    Marching -- --  -- 2x10 B 1# 2x10 B Next                Standing:           Heel raises 2x15 3x10 3x10 3x10 2x10 2x10 DL Next    Calf stretch on fitter 30"x3 DL  fitter 3x30" DL on fitter 3x30" DL " 3x30'' DL 3x30'' DL 3x30'' DL Next    Steamboats 2x12 RTC 2x10 B RTC 2x10 B RTC NT 2x10 B RTC 2x10 B Next    TKEs  ---   15x5'' B Next      Step ups -- declined - Hold for now 2x10 B L1 2x10 B L1  1 HR    Step downs -- declined - Hold for now Next      SLS  --         Tandem walking  --         Cybex HS curls 3.0 DL  3x10 2x10 DL  3.0  NT 2x10 DL  3.0      Cybex LAQ  --         Leg Press 4.0 W/ball  2x15 DL 3x10 DL 4.0 w/ ball squeeze 3x10 DL 4.0  3x10 DL  4 plates Next   Next    CP -- --         INITIALS DH 1/6 FS CK DAMARIS OCAMPO       Assessment:     Patient able to increase activity with added reps and weight as noted without complaint of pain or difficulty.  States no pain after treatment.    Patient Education/Response:     Patient educated to continue HEP.  Verbalized understanding.    Plans and Goals:     Cont per POC, progress per pt tolerance.    GOALS: Short Term Goals:  4 weeks  1. Pt will demo good seated and standing posture.  2. Increase strength by 1/3 MMT grade in BLE  to increase tolerance for ADL and work activities.  3. Pt to tolerate HEP to improve ROM and independence with ADL's.     Long Term Goals: 8 weeks  1.Report decreased B knee, L hip, and R calf pain </= 3/10  to increase tolerance for ADLs.  2.Patient goal: Not have pain in my legs  3.Increase strength to >/= 4+/5 in BLE to increase tolerance for ADL and work activities.  4. Pt will report at </=59% impaired on KNEE FOTO to demonstrate increase in LE function with every day tasks.   5. Pt will negotiate 8 steps and ambulate community distances at >/= Mod I for increased functional mobility.  6. Increase knee and lumbar ROM to WNL in order to be able to perform ADLs without difficulty.  7. Pt will ambulate with min gait deviations and without AD at home for increased functional mobility.

## 2018-09-24 ENCOUNTER — CLINICAL SUPPORT (OUTPATIENT)
Dept: REHABILITATION | Facility: HOSPITAL | Age: 63
End: 2018-09-24
Payer: MEDICAID

## 2018-09-24 DIAGNOSIS — G89.29 CHRONIC PAIN OF BOTH KNEES: ICD-10-CM

## 2018-09-24 DIAGNOSIS — Z74.09 DECREASED MOBILITY AND ENDURANCE: ICD-10-CM

## 2018-09-24 DIAGNOSIS — M25.562 CHRONIC PAIN OF BOTH KNEES: ICD-10-CM

## 2018-09-24 DIAGNOSIS — M62.81 DECREASED MUSCLE STRENGTH: ICD-10-CM

## 2018-09-24 DIAGNOSIS — M25.561 CHRONIC PAIN OF BOTH KNEES: ICD-10-CM

## 2018-09-24 PROCEDURE — 97110 THERAPEUTIC EXERCISES: CPT | Mod: PN

## 2018-09-24 NOTE — PROGRESS NOTES
"DAILY TREATMENT NOTE    DATE: 9/24/2018    Start Time:  1100  Stop Time:  1155    PROCEDURES:    TIMED  Procedure Min.   Therex 55                 UNTIMED  Procedure Min.             Total Timed Minutes:  55  Total Timed Units:  4  Total Untimed Units:  0  Charges Billed/# of units:  4 TE      Progress/Current Status    Subjective:     Patient ID: Mali Dias is a 63 y.o. female.  Diagnosis:   1. Decreased muscle strength     2. Decreased mobility and endurance     3. Chronic pain of both knees       Pt reports pain in L knee is much better, but that she still has episodes of pain of R medial foot and lower leg at 8-9/10 pain. Right now R lower leg is just sore. She reports no excessive heavy activities and unsure what causes it.    Objective:     Patient performed all therex per log below with PT x 55 min.    DATE 9/24/18 9/21/18 9/18/18 9/13/18 9/11/18 9/7/18 9/5/18 8/14/2018   VISIT 8 7 6 5 4 3 2 1   POC   10/14/18            FOTO 8/10 7/10 Done 6/10 5/5 4/5 3/5 2/5 1/5   Bike 5' 5' 5'  5' 5' 5' Next    MHP  -- --         MT  -- --         Stretches:            HS stretch 2x30'' B  D/c  30"x3 B 3 x 30'' 2x45"  2x45'' B 3x30'' B 3x30'' B     Piriformis stretch 2x30'' B           Supine:            Quad sets NT 5" hold  2x10 2x10 B 5'' holds  NT 2x10 B 5'' holds 2x10 5'' holds Next B   SLR 2x10 1# B 2x15 3x10 B 3x10 B 3x10 B 2x10 B 2x10 B Next    SAQ   --   D/c 2x15 B  D/c next     Knee flexion stretch   --         Heel slides   --         SL hip abd 1# 2x10 B 1# 2x10 B 2x15 B 2x15 B 2x15 B 2x10 B 2x10 B Next    Clams  RTB   2x15 B 3x10 RTB B 2x10 RTB B Next  2x10 B RTB Next      Hip add   -- - 15x5''  D/c next 15x5'' w/ ball 15x5'' w/ ball Next    Bridges 2x20 2x15 2x15 2x15 2x15  3x10 2x10 Next        --         Prone:   --         Quad stretch 2x30'' 30"x3 B 3x30"  3x30"  Next  Next        lidia   --                      Seated:            SUSANA  -- --  -- 2x10 B 1# 2x10 B Next    Marching  -- --  -- 2x10 B 1# " "2x10 B Next                 Standing:            Heel raises 2x15 2x15 3x10 3x10 3x10 2x10 2x10 DL Next    Calf stretch on fitter 3x30'' DL 30"x3 DL  fitter 3x30" DL on fitter 3x30" DL 3x30'' DL 3x30'' DL 3x30'' DL Next    Steamboats 2x15 RTC 2x12 RTC 2x10 B RTC 2x10 B RTC NT 2x10 B RTC 2x10 B Next    TKEs   ---   15x5'' B Next      Step ups 2x10 L1 B -- declined - Hold for now 2x10 B L1 2x10 B L1  1 HR    Step downs 2x10 B L1 -- declined - Hold for now Next      SLS   --         Tandem walking   --         Cybex HS curls NT 3.0 DL  3x10 2x10 DL  3.0  NT 2x10 DL  3.0      Cybex LAQ   --         Leg Press 5.0 DL w/ ball 3x10 DL 4.0 W/ball  2x15 DL 3x10 DL 4.0 w/ ball squeeze 3x10 DL 4.0  3x10 DL  4 plates Next   Next    CP  -- --         INITIALS DAMARIS DH 1/6 FS CK DAMARIS DAMARIS DAMARIS DAMRAIS       Assessment:     Pt reports no pain after session, and tolerated increased weight and reps well today. Cont to monitor R lower leg pain, refer back to MD if conts to be a significant issue.    Patient Education/Response:     Patient educated to continue HEP.  Verbalized understanding.    Plans and Goals:     Cont per POC, progress per pt tolerance.    GOALS: Short Term Goals:  4 weeks  1. Pt will demo good seated and standing posture.  2. Increase strength by 1/3 MMT grade in BLE  to increase tolerance for ADL and work activities.  3. Pt to tolerate HEP to improve ROM and independence with ADL's.     Long Term Goals: 8 weeks  1.Report decreased B knee, L hip, and R calf pain </= 3/10  to increase tolerance for ADLs.  2.Patient goal: Not have pain in my legs  3.Increase strength to >/= 4+/5 in BLE to increase tolerance for ADL and work activities.  4. Pt will report at </=59% impaired on KNEE FOTO to demonstrate increase in LE function with every day tasks.   5. Pt will negotiate 8 steps and ambulate community distances at >/= Mod I for increased functional mobility.  6. Increase knee and lumbar ROM to WNL in order to be able to perform " ADLs without difficulty.  7. Pt will ambulate with min gait deviations and without AD at home for increased functional mobility.

## 2018-10-02 ENCOUNTER — CLINICAL SUPPORT (OUTPATIENT)
Dept: REHABILITATION | Facility: HOSPITAL | Age: 63
End: 2018-10-02
Payer: MEDICAID

## 2018-10-02 DIAGNOSIS — M62.81 DECREASED MUSCLE STRENGTH: ICD-10-CM

## 2018-10-02 DIAGNOSIS — Z74.09 DECREASED MOBILITY AND ENDURANCE: ICD-10-CM

## 2018-10-02 DIAGNOSIS — M25.562 CHRONIC PAIN OF BOTH KNEES: ICD-10-CM

## 2018-10-02 DIAGNOSIS — M25.561 CHRONIC PAIN OF BOTH KNEES: ICD-10-CM

## 2018-10-02 DIAGNOSIS — G89.29 CHRONIC PAIN OF BOTH KNEES: ICD-10-CM

## 2018-10-02 PROCEDURE — 97110 THERAPEUTIC EXERCISES: CPT | Mod: PN

## 2018-10-02 NOTE — PROGRESS NOTES
"DAILY TREATMENT NOTE    DATE: 10/2/2018    Start Time:  0804  Stop Time:  0900    PROCEDURES:    TIMED  Procedure Min.   Supervised TE 15   TE 39                 UNTIMED  Procedure Min.             Total Timed Minutes:  39  Total Timed Units:  3  Total Untimed Units:  0  Charges Billed/# of units:  3 TE      Progress/Current Status    Subjective:     Patient ID: Mali Dias is a 63 y.o. female.  Diagnosis:   1. Decreased muscle strength     2. Decreased mobility and endurance     3. Chronic pain of both knees       Pain: 1 /10  Pt stated that she is not really in much pain at all just a little stiffness.    Objective:     Session initiated with supervised TE on bike x 5'. Patient performed  therex per log below with PT x 39 min f/b supervised PT x 10' at end of session.    DATE 10/2/18 9/24/18 9/21/18 9/18/18 9/13/18 9/11/18 9/7/18 9/5/18 8/14/2018   VISIT 9 NEXT 8 7 6 5 4 3 2 1   POC   10/14/18             FOTO 9/10 NEXT 8/10 7/10 Done 6/10 5/5 4/5 3/5 2/5 1/5   Bike 5' 5' 5' 5'  5' 5' 5' Next    MHP --  -- --         MT --  -- --         Stretches:             HS stretch HEP supine   3 x 30'' stairs 2x30'' B  D/c  30"x3 B 3 x 30'' 2x45"  2x45'' B 3x30'' B 3x30'' B     Piriformis stretch 2x30'' B 2x30'' B           Supine:             Quad sets 5" hold  2x10 NT 5" hold  2x10 2x10 B 5'' holds  NT 2x10 B 5'' holds 2x10 5'' holds Next B   SLR 2x10 1# B 2x10 1# B 2x15 3x10 B 3x10 B 3x10 B 2x10 B 2x10 B Next    SAQ --   --   D/c 2x15 B  D/c next     Knee flexion stretch --   --         Heel slides --   --         SL hip abd 1# 2x10 B 1# 2x10 B 1# 2x10 B 2x15 B 2x15 B 2x15 B 2x10 B 2x10 B Next    Clams 2 x 15 B  SL RTB  RTB   2x15 B 3x10 RTB B 2x10 RTB B Next  2x10 B RTB Next      Hip add --   -- - 15x5''  D/c next 15x5'' w/ ball 15x5'' w/ ball Next    Bridges 2x20 2x20 2x15 2x15 2x15 2x15  3x10 2x10 Next         --         Prone:    --         Quad stretch 2x30'' 2x30'' 30"x3 B 3x30"  3x30"  Next  Next       HS curls " "---   --                       Seated:             LAQ --  -- --  -- 2x10 B 1# 2x10 B Next    Marching --  -- --  -- 2x10 B 1# 2x10 B Next                  Standing:             Heel raises 2x15 2x15 2x15 3x10 3x10 3x10 2x10 2x10 DL Next    Calf stretch on fitter 3x30'' DL 3x30'' DL 30"x3 DL  fitter 3x30" DL on fitter 3x30" DL 3x30'' DL 3x30'' DL 3x30'' DL Next    Steamboats 2x15 RTC 2x15 RTC 2x12 RTC 2x10 B RTC 2x10 B RTC NT 2x10 B RTC 2x10 B Next    TKEs 15x5'' B   ---   15x5'' B Next      Step ups 2x10 L1 B 2x10 L1 B -- declined - Hold for now 2x10 B L1 2x10 B L1  1 HR    Step downs 2x10 B L1 2x10 B L1 -- declined - Hold for now Next      SLS --   --         Tandem walking --   --         Cybex HS curls 3.0 DL  3x10 NT 3.0 DL  3x10 2x10 DL  3.0  NT 2x10 DL  3.0      Cybex LAQ --   --         Leg Press 5.0 DL w/ ball 3x10 DL 5.0 DL w/ ball 3x10 DL 4.0 W/ball  2x15 DL 3x10 DL 4.0 w/ ball squeeze 3x10 DL 4.0  3x10 DL  4 plates Next   Next    CP --  -- --         INITIALS FS DAMARIS DH 1/6 FS CK DAMARIS DAMARIS DAMARIS DAMARIS       Assessment:      Pt completed all aspects of tx w/ no complaints of symptom exacerbation.  Pt tolerated newly added TKE well, requiring verbal and visual cueing to limit hip movement and isolate terminal knee extension.  Pt leaves with only mild reports of left knee stiffness.      Patient Education/Response:     CONT HEP and stretches daily as needed when feeling stiff.     Plans and Goals:     Cont per POC, progress per pt tolerance.    GOALS: Short Term Goals:  4 weeks  1. Pt will demo good seated and standing posture.  2. Increase strength by 1/3 MMT grade in BLE  to increase tolerance for ADL and work activities.  3. Pt to tolerate HEP to improve ROM and independence with ADL's.     Long Term Goals: 8 weeks  1.Report decreased B knee, L hip, and R calf pain </= 3/10  to increase tolerance for ADLs.  2.Patient goal: Not have pain in my legs  3.Increase strength to >/= 4+/5 in BLE to increase tolerance " for ADL and work activities.  4. Pt will report at </=59% impaired on KNEE FOTO to demonstrate increase in LE function with every day tasks.   5. Pt will negotiate 8 steps and ambulate community distances at >/= Mod I for increased functional mobility.  6. Increase knee and lumbar ROM to WNL in order to be able to perform ADLs without difficulty.  7. Pt will ambulate with min gait deviations and without AD at home for increased functional mobility.

## 2018-10-03 NOTE — PROGRESS NOTES
"  Physical Therapy Daily Treatment Note     Name: Martha Dias  Clinic Number: [id number]    Therapy Diagnosis: No diagnosis found.  Physician: Park Wayne PA-C    Visit Date: 10/4/2018    Physician Orders: PT eval and treat  Medical Diagnosis:  M67.01 (ICD-10-CM) - Contracture of right Achilles tendon   R20.0 (ICD-10-CM) - Numbness of right lower extremity   M17.0 (ICD-10-CM) - Primary osteoarthritis of both knees   M79.661 (ICD-10-CM) - Right calf pain        Evaluation Date: 8/14/18  Authorization Period Expiration:   Plan of Care Certification Period: 8/14/18 to 10/14/18  Visit #/Visits authorized: ***/ 18    Time In: ***  Time Out: ***  Total Billable Time: *** minutes    Precautions: R radial fracture in 2015, HTN    Subjective     Pt reports: ***.  She {Actions; was/was not:11973} compliant with home exercise program.  Response to previous treatment: ***  Functional change: ***    Pain: {0-10:87806::"0"}/10  Location: {RIGHT/LEFT/BILATERAL:70670} {LOCATION ON BODY:34758}     Objective     MARTHA received therapeutic exercises to develop {AMB PT PROGRESS OBJECTIVE:00778} for *** minutes including:  ***  DATE 10/2/18 9/24/18 9/21/18 9/18/18   VISIT 9 NEXT 8 7 6   POC   10/14/18           FOTO 9/10 NEXT 8/10 7/10 Done 6/10   Bike 5' 5' 5' 5'   MHP --   -- --   MT --   -- --   Stretches:           HS stretch HEP supine   3 x 30'' stairs 2x30'' B  D/c  30"x3 B 3 x 30''   Piriformis stretch 2x30'' B 2x30'' B       Supine:           Quad sets 5" hold  2x10 NT 5" hold  2x10 2x10 B 5'' holds   SLR 2x10 1# B 2x10 1# B 2x15 3x10 B   SAQ --     --   Knee flexion stretch --     --   Heel slides --     --   SL hip abd 1# 2x10 B 1# 2x10 B 1# 2x10 B 2x15 B   Clams 2 x 15 B  SL RTB   RTB   2x15 B 3x10 RTB B   Hip add --     --   Bridges 2x20 2x20 2x15 2x15           --   Prone:       --   Quad stretch 2x30'' 2x30'' 30"x3 B 3x30"    JASMINA murillo ---     --               Seated:           SUSANA --   -- --   Marching --   -- --       " "        Standing:           Heel raises 2x15 2x15 2x15 3x10   Calf stretch on fitter 3x30'' DL 3x30'' DL 30"x3 DL  fitter 3x30" DL on fitter   Steamboats 2x15 RTC 2x15 RTC 2x12 RTC 2x10 B RTC   TKEs 15x5'' B     ---   Step ups 2x10 L1 B 2x10 L1 B -- declined   Step downs 2x10 B L1 2x10 B L1 -- declined   SLS --     --   Tandem walking --     --   Cybex HS curls 3.0 DL  3x10 NT 3.0 DL  3x10 2x10 DL  3.0   Cybex LAQ --     --   Leg Press 5.0 DL w/ ball 3x10 DL 5.0 DL w/ ball 3x10 DL 4.0 W/ball  2x15 DL 3x10 DL 4.0 w/ ball squeeze   CP --   -- --   INITIALS FS DAMARIS DH 1/6 FS         MARTHA received cold pack for *** minutes to ***.      Home Exercises Provided and Patient Education Provided     Education provided:   - ***    Written Home Exercises Provided: {Blank single:25927::"yes","Patient instructed to cont prior HEP"}.  Exercises were reviewed and MARTHA was able to demonstrate them prior to the end of the session.  MARTHA demonstrated {Desc; good/fair/poor:46039} understanding of the education provided.     See EMR under {Blank single:12315::"Media","Patient Instructions"} for exercises provided {Blank single:81501::"10/4/2018","prior visit"}.    Assessment     ***  MARTHA {IS/IS NOT:29510} progressing well towards her goals.   Pt prognosis is {REHAB PROGNOSIS OHS:78723}.     Pt will continue to benefit from skilled outpatient physical therapy to address the deficits listed in the problem list box on initial evaluation, provide pt/family education and to maximize pt's level of independence in the home and community environment.     Pt's spiritual, cultural and educational needs considered and pt agreeable to plan of care and goals.    Anticipated barriers to physical therapy: none    Goals:   Short Term Goals:  4 weeks  1. Pt will demo good seated and standing posture.  2. Increase strength by 1/3 MMT grade in BLE  to increase tolerance for ADL and work activities.  3. Pt to tolerate HEP to improve ROM and independence " with ADL's.     Long Term Goals: 8 weeks  1.Report decreased B knee, L hip, and R calf pain </= 3/10  to increase tolerance for ADLs.  2.Patient goal: Not have pain in my legs  3.Increase strength to >/= 4+/5 in BLE to increase tolerance for ADL and work activities.  4. Pt will report at </=59% impaired on KNEE FOTO to demonstrate increase in LE function with every day tasks.   5. Pt will negotiate 8 steps and ambulate community distances at >/= Mod I for increased functional mobility.  6. Increase knee and lumbar ROM to WNL in order to be able to perform ADLs without difficulty.  7. Pt will ambulate with min gait deviations and without AD at home for increased functional mobility.    Plan     ***    Lela Link, PTA

## 2018-10-04 ENCOUNTER — CLINICAL SUPPORT (OUTPATIENT)
Dept: REHABILITATION | Facility: HOSPITAL | Age: 63
End: 2018-10-04
Payer: MEDICAID

## 2018-10-04 DIAGNOSIS — M62.81 DECREASED MUSCLE STRENGTH: ICD-10-CM

## 2018-10-04 DIAGNOSIS — G89.29 CHRONIC PAIN OF BOTH KNEES: ICD-10-CM

## 2018-10-04 DIAGNOSIS — M25.561 CHRONIC PAIN OF BOTH KNEES: ICD-10-CM

## 2018-10-04 DIAGNOSIS — M25.562 CHRONIC PAIN OF BOTH KNEES: ICD-10-CM

## 2018-10-04 DIAGNOSIS — Z74.09 DECREASED MOBILITY AND ENDURANCE: ICD-10-CM

## 2018-10-04 PROCEDURE — 97110 THERAPEUTIC EXERCISES: CPT | Mod: PN

## 2018-10-04 PROCEDURE — 97530 THERAPEUTIC ACTIVITIES: CPT | Mod: PN

## 2018-10-04 NOTE — PROGRESS NOTES
"DAILY TREATMENT NOTE    DATE: 10/4/2018    Start Time:  0805  Stop Time:  0845    PROCEDURES:    TIMED  Procedure Min.   Supervised TE    TE 30   TA 10             UNTIMED  Procedure Min.             Total Timed Minutes: 40  Total Timed Units:  3  Total Untimed Units:  0  Charges Billed/# of units:  3 TE      Progress/Current Status    Subjective:     Patient ID: Mali Dias is a 63 y.o. female.  Diagnosis:   1. Decreased muscle strength     2. Decreased mobility and endurance     3. Chronic pain of both knees       Pain:7-8/10  Pt stated that she had increased L knee (anterior) and R knee (anterior/posterior) and R medial ankle and lower leg pain since lats visit. Pt was visibly frustrated about pain starting again later that night that was sharp and shooting from her ankle to her knee. Pt reports that it was from an exercise (TKEs) that she thought was being performed wrong that gave her the pain later that night. 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    Objective:     TA x 10 mins consisting of PT educating of probable causes of her R medial knee pain, expected muscle soreness and joint stiffness for some activities compared to unwanted sharp pain from her R medial lower leg.  Patient performed therex per log below with PT x 30 min including tests and measures and bike x 5 mins. FOTO done today.  DATE 10/4/18 10/2/18 9/24/18 9/21/18 9/18/18 9/13/18 9/11/18 9/7/18 9/5/18 8/14/2018   VISIT 10 9 NEXT 8 7 6 5 4 3 2 1   POC   10/14/18              FOTO 10/10 9/10 NEXT 8/10 7/10 Done 6/10 5/5 4/5 3/5 2/5 1/5   Bike 5' 5' 5' 5' 5'  5' 5' 5' Next    MHP  --  -- --         MT  --  -- --         Stretches:              HS stretch NT HEP supine   3 x 30'' stairs 2x30'' B  D/c  30"x3 B 3 x 30'' 2x45"  2x45'' B 3x30'' B 3x30'' B     Piriformis stretch NT 2x30'' B 2x30'' B           Supine:          " "    Quad sets NT 5" hold  2x10 NT 5" hold  2x10 2x10 B 5'' holds  NT 2x10 B 5'' holds 2x10 5'' holds Next B   SLR  2x10 1# B 2x10 1# B 2x15 3x10 B 3x10 B 3x10 B 2x10 B 2x10 B Next    SAQ  --   --   D/c 2x15 B  D/c next     Knee flexion stretch  --   --         Heel slides  --   --         SL hip abd NT 1# 2x10 B 1# 2x10 B 1# 2x10 B 2x15 B 2x15 B 2x15 B 2x10 B 2x10 B Next    Clams NT 2 x 15 B  SL RTB  RTB   2x15 B 3x10 RTB B 2x10 RTB B Next  2x10 B RTB Next      Hip add  --   -- - 15x5''  D/c next 15x5'' w/ ball 15x5'' w/ ball Next    Bridges NT 2x20 2x20 2x15 2x15 2x15 2x15  3x10 2x10 Next          --         Prone:     --         Quad stretch NT 2x30'' 2x30'' 30"x3 B 3x30"  3x30"  Next  Next       HS curls  ---   --                        Seated:              LAQ  --  -- --  -- 2x10 B 1# 2x10 B Next    Marching  --  -- --  -- 2x10 B 1# 2x10 B Next                   Standing:              Heel raises NT 2x15 2x15 2x15 3x10 3x10 3x10 2x10 2x10 DL Next    Calf stretch on fitter NT 3x30'' DL 3x30'' DL 30"x3 DL  fitter 3x30" DL on fitter 3x30" DL 3x30'' DL 3x30'' DL 3x30'' DL Next    Steamboats NT 2x15 RTC 2x15 RTC 2x12 RTC 2x10 B RTC 2x10 B RTC NT 2x10 B RTC 2x10 B Next    TKEs Hold  15x5'' B   ---   15x5'' B Next      Step ups NT 2x10 L1 B 2x10 L1 B -- declined - Hold for now 2x10 B L1 2x10 B L1  1 HR    Step downs NT 2x10 B L1 2x10 B L1 -- declined - Hold for now Next      SLS  --   --         Tandem walking  --   --         Cybex HS curls NT 3.0 DL  3x10 NT 3.0 DL  3x10 2x10 DL  3.0  NT 2x10 DL  3.0      Cybex LAQ  --   --         Leg Press NT 5.0 DL w/ ball 3x10 DL 5.0 DL w/ ball 3x10 DL 4.0 W/ball  2x15 DL 3x10 DL 4.0 w/ ball squeeze 3x10 DL 4.0  3x10 DL  4 plates Next   Next    CP  --  -- --         INITIALS DAMARIS FS DAMARIS DH 1/6 FS CK DAMARIS DAMARIS DAMARIS DAMARIS     10/4/18  Range of Motion/Strength:   * = pain with testing  AROM: LUMBAR   Flexion 80%   Extension 80%   Right side bending 80%   Left side bending 80%   Right " rotation 100%   Left rotation 100%      Hip   Right     Left   Pain/Dysfunction with Movement     AROM PROM MMT AROM PROM MMT     Flexion WFL WFL 3+/5 WFL WFL 3+/5     Extension WFL WFL NT WFL WFL NT     Abduction WFL WFL 4/5 WFL WFL 4-/5     Adduction WFL WFL 4+/5 WFL WFL 4+/5     Internal rotation WFL WFL NT WFL WFL NT     External rotation WFL WFL NT WFL WFL NT        Knee   Right     Left   Pain/Dysfunction with Movement     AROM PROM MMT AROM PROM MMT     Flexion WFL WFL 4/5 WFL WFL 4+/5 Neurological weakness felt on R testing   Extension WFL WFL 4/5 WFL WFL 4/5 Neurological weakness felt on R testing      Ankle   Right     Left   Pain/Dysfunction with Movement     AROM PROM MMT AROM PROM MMT     Plantarflexion WFL WFL 4/5 WFL WFL 4/5 Neurological weakness felt on R testing   Dorsiflexion WFL WFL 4/5 WFL WFL 4/5 Neurological weakness felt on R testing   Inversion WFL WFL 4/5 WFL WFL 4/5 Neurological weakness felt on R testing   Eversion 75% WFL 4-/5 WFL WFL 4/5 Neurological weakness felt on R testing; R medial ankle pain with R testing      Special Tests:   FADIR = negative on L, negative on R  LEONOR = positive on L with lateral hip pain down to knee, negative on R  Scour Test = negative B  Anterior Drawer Test = negative B  Cummings test = negative B  Patellar Grind Test = NT  Knee Varus Stress Test = negative B  Knee Valgus Stress Test = negative B    Assessment:     No treatment today with re-assessment done. Pt's R medial lower leg pain appears to be poorly correlated with past session's exercises despite changes to her current pain levels. Pain in R lower leg does appear to overall respond to exercises well over course of entire treatment; however long term relief is doubtful due to recent development of her pain reoccurring. Will examine R ankle and lower leg of possible peripheral nerve entrapment or ankle/foot joint issues.    Patient Education/Response:     CONT HEP and stretches daily as needed when  feeling stiff.     Plans and Goals:     Cont per POC, progress per pt tolerance.    GOALS: Short Term Goals:  4 weeks  1. Pt will demo good seated and standing posture.  2. Increase strength by 1/3 MMT grade in BLE  to increase tolerance for ADL and work activities.  3. Pt to tolerate HEP to improve ROM and independence with ADL's.     Long Term Goals: 8 weeks  1.Report decreased B knee, L hip, and R calf pain </= 3/10  to increase tolerance for ADLs.  2.Patient goal: Not have pain in my legs  3.Increase strength to >/= 4+/5 in BLE to increase tolerance for ADL and work activities.  4. Pt will report at </=59% impaired on KNEE FOTO to demonstrate increase in LE function with every day tasks.   5. Pt will negotiate 8 steps and ambulate community distances at >/= Mod I for increased functional mobility.  6. Increase knee and lumbar ROM to WNL in order to be able to perform ADLs without difficulty.  7. Pt will ambulate with min gait deviations and without AD at home for increased functional mobility.

## 2018-10-08 ENCOUNTER — CLINICAL SUPPORT (OUTPATIENT)
Dept: REHABILITATION | Facility: HOSPITAL | Age: 63
End: 2018-10-08
Payer: MEDICAID

## 2018-10-08 DIAGNOSIS — M25.561 CHRONIC PAIN OF BOTH KNEES: ICD-10-CM

## 2018-10-08 DIAGNOSIS — M25.562 CHRONIC PAIN OF BOTH KNEES: ICD-10-CM

## 2018-10-08 DIAGNOSIS — Z74.09 DECREASED MOBILITY AND ENDURANCE: ICD-10-CM

## 2018-10-08 DIAGNOSIS — G89.29 CHRONIC PAIN OF BOTH KNEES: ICD-10-CM

## 2018-10-08 DIAGNOSIS — M62.81 DECREASED MUSCLE STRENGTH: ICD-10-CM

## 2018-10-08 PROCEDURE — 97110 THERAPEUTIC EXERCISES: CPT | Mod: PN

## 2018-10-08 NOTE — PROGRESS NOTES
"DAILY TREATMENT NOTE    DATE: 10/8/2018    Start Time:  10:00  Stop Time:  11:00    PROCEDURES:    TIMED  Procedure Min.   Supervised TE    TE 55   TA              UNTIMED  Procedure Min.   Bike 5         Total Timed Minutes: 55  Total Timed Units:  4  Total Untimed Units:  0  Charges Billed/# of units:  4 TE      Progress/Current Status    Subjective:     Patient ID: Mali Dias is a 63 y.o. female.  Diagnosis:   1. Decreased muscle strength     2. Decreased mobility and endurance     3. Chronic pain of both knees       Pain: 5/10  Pt reports that her B knee pain and R lower leg pain is much better, and has been resting it up to this date.    Objective:   O: R ankle assessment demo tenderness at R navicular tuberosity and appears to go up along posterior tibialis tendon with tenderness along medial calf area with deep palpation. Decreased post tib and peroneal strength with poor R ankle DF.     Bike x 5 mins to decrease B ankle and knee stiffness and increased BLE blood flow. Pt performed TE x 55 mins per log below    DATE 10/8/18 10/4/18 10/2/18 9/24/18 9/21/18 9/18/18 9/13/18 9/11/18 9/7/18 9/5/18 8/14/2018   VISIT 11 10 9 NEXT 8 7 6 5 4 3 2 1   POC   10/14/18               FOTO 11 10/10 9/10 NEXT 8/10 7/10 Done 6/10 5/5 4/5 3/5 2/5 1/5   Bike 5' 5' 5' 5' 5' 5'  5' 5' 5' Next    MHP   --  -- --         MT   --  -- --         Stretches:               HS stretch 3x30'' B w/ strap NT HEP supine   3 x 30'' stairs 2x30'' B  D/c  30"x3 B 3 x 30'' 2x45"  2x45'' B 3x30'' B 3x30'' B     Piriformis stretch -- NT 2x30'' B 2x30'' B           Supine:               Quad sets -- NT 5" hold  2x10 NT 5" hold  2x10 2x10 B 5'' holds  NT 2x10 B 5'' holds 2x10 5'' holds Next B   SLR --  2x10 1# B 2x10 1# B 2x15 3x10 B 3x10 B 3x10 B 2x10 B 2x10 B Next    SAQ   --   --   D/c 2x15 B  D/c next     Knee flexion stretch   --   --         Heel slides   --   --         SL hip abd 1# 3x10 B NT 1# 2x10 B 1# 2x10 B 1# 2x10 B 2x15 B 2x15 B 2x15 " "B 2x10 B 2x10 B Next    Clams Next  NT 2 x 15 B  SL RTB  RTB   2x15 B 3x10 RTB B 2x10 RTB B Next  2x10 B RTB Next      Hip add   --   -- - 15x5''  D/c next 15x5'' w/ ball 15x5'' w/ ball Next    Bridges  NT 2x20 2x20 2x15 2x15 2x15 2x15  3x10 2x10 Next    SL inversion 2x10 R             SL eversion 2x10 R     --         Prone:      --         Quad stretch Next  NT 2x30'' 2x30'' 30"x3 B 3x30"  3x30"  Next  Next       HS curls   ---   --                         Seated:               LAQ   --  -- --  -- 2x10 B 1# 2x10 B Next    Marching   --  -- --  -- 2x10 B 1# 2x10 B Next                    Standing:               Heel raises 3x10 DL NT 2x15 2x15 2x15 3x10 3x10 3x10 2x10 2x10 DL Next    Calf stretch on fitter 3x30'' DL NT 3x30'' DL 3x30'' DL 30"x3 DL  fitter 3x30" DL on fitter 3x30" DL 3x30'' DL 3x30'' DL 3x30'' DL Next    Steamboats NT NT 2x15 RTC 2x15 RTC 2x12 RTC 2x10 B RTC 2x10 B RTC NT 2x10 B RTC 2x10 B Next    TKEs  Hold  15x5'' B   ---   15x5'' B Next      Step ups NT NT 2x10 L1 B 2x10 L1 B -- declined - Hold for now 2x10 B L1 2x10 B L1  1 HR    Step downs NT NT 2x10 B L1 2x10 B L1 -- declined - Hold for now Next      SLS   --   --         Tandem walking 3 laps in //  BUE use  --   --         Cybex HS curls NT NT 3.0 DL  3x10 NT 3.0 DL  3x10 2x10 DL  3.0  NT 2x10 DL  3.0      Cybex LAQ   --   --         Leg Press 2x10 DL  5.5 plates  S=5 NT 5.0 DL w/ ball 3x10 DL 5.0 DL w/ ball 3x10 DL 4.0 W/ball  2x15 DL 3x10 DL 4.0 w/ ball squeeze 3x10 DL 4.0  3x10 DL  4 plates Next   Next    CP   --  -- --         INITIALS DAMARIS OCAMPO  1/6 FS CK DAMARIS OCAMPO       Assessment:     Pt demo signs and symptoms R posterior tibialis tendonitis. Pt tolerated all TE well without increase in pain, pt instructed to perform SL R inversion/eversion and calf stretching at home.    Patient Education/Response:     CONT HEP and stretches daily as needed when feeling stiff.     Plans and Goals:     Cont per POC, progress per pt " tolerance.    GOALS: Short Term Goals:  4 weeks  1. Pt will demo good seated and standing posture.  2. Increase strength by 1/3 MMT grade in BLE  to increase tolerance for ADL and work activities.  3. Pt to tolerate HEP to improve ROM and independence with ADL's.     Long Term Goals: 8 weeks  1.Report decreased B knee, L hip, and R calf pain </= 3/10  to increase tolerance for ADLs.  2.Patient goal: Not have pain in my legs  3.Increase strength to >/= 4+/5 in BLE to increase tolerance for ADL and work activities.  4. Pt will report at </=59% impaired on KNEE FOTO to demonstrate increase in LE function with every day tasks.   5. Pt will negotiate 8 steps and ambulate community distances at >/= Mod I for increased functional mobility.  6. Increase knee and lumbar ROM to WNL in order to be able to perform ADLs without difficulty.  7. Pt will ambulate with min gait deviations and without AD at home for increased functional mobility.

## 2018-10-10 ENCOUNTER — CLINICAL SUPPORT (OUTPATIENT)
Dept: REHABILITATION | Facility: HOSPITAL | Age: 63
End: 2018-10-10
Payer: MEDICAID

## 2018-10-10 DIAGNOSIS — M62.81 DECREASED MUSCLE STRENGTH: ICD-10-CM

## 2018-10-10 DIAGNOSIS — Z74.09 DECREASED MOBILITY AND ENDURANCE: ICD-10-CM

## 2018-10-10 DIAGNOSIS — G89.29 CHRONIC PAIN OF BOTH KNEES: ICD-10-CM

## 2018-10-10 DIAGNOSIS — M25.561 CHRONIC PAIN OF BOTH KNEES: ICD-10-CM

## 2018-10-10 DIAGNOSIS — M25.562 CHRONIC PAIN OF BOTH KNEES: ICD-10-CM

## 2018-10-10 PROCEDURE — 97110 THERAPEUTIC EXERCISES: CPT | Mod: PN

## 2018-10-10 NOTE — PROGRESS NOTES
"DAILY TREATMENT NOTE    DATE: 10/10/2018    Start Time:  8:05  Stop Time:  9:00    PROCEDURES:    TIMED  Procedure Min.   Supervised TE    TE 30   TA              UNTIMED  Procedure Min.   Bike 5         Total Timed Minutes: 30  Total Timed Units:  2  Total Untimed Units:  0  Charges Billed/# of units:  2 TE      Progress/Current Status    Subjective:     Patient ID: Mali Dias is a 63 y.o. female.  Diagnosis:   1. Decreased muscle strength     2. Decreased mobility and endurance     3. Chronic pain of both knees       Pain: 4-5/10  Pt reports 4-5/10 pain in R medial lower leg and 2-3/10 in B knees    Objective:   O: R ankle assessment demo tenderness at R navicular tuberosity and appears to go up along posterior tibialis tendon with tenderness along medial calf area with deep palpation. Decreased post tib and peroneal strength with poor R ankle DF.     Bike x 5 mins to decrease B ankle and knee stiffness and increased BLE blood flow. Supervised TE x 20 mins and then TE 1:1 with PT per log below for 30 mins.    DATE 10/10/18 10/8/18 10/4/18 10/2/18 9/24/18 9/21/18 9/18/18 9/13/18 9/11/18 9/7/18 9/5/18 8/14/2018   VISIT 12 11 10 9 NEXT 8 7 6 5 4 3 2 1   POC   10/14/18                FOTO 12 11 10/10 9/10 NEXT 8/10 7/10 Done 6/10 5/5 4/5 3/5 2/5 1/5   Bike 5' 5' 5' 5' 5' 5' 5'  5' 5' 5' Next    MHP    --  -- --         MT    --  -- --         Stretches:                HS stretch Next  3x30'' B w/ strap NT HEP supine   3 x 30'' stairs 2x30'' B  D/c  30"x3 B 3 x 30'' 2x45"  2x45'' B 3x30'' B 3x30'' B     Piriformis stretch  -- NT 2x30'' B 2x30'' B           Supine:                Quad sets  -- NT 5" hold  2x10 NT 5" hold  2x10 2x10 B 5'' holds  NT 2x10 B 5'' holds 2x10 5'' holds Next B   SLR  --  2x10 1# B 2x10 1# B 2x15 3x10 B 3x10 B 3x10 B 2x10 B 2x10 B Next    SAQ    --   --   D/c 2x15 B  D/c next     Knee flexion stretch    --   --         Heel slides    --   --         SL hip abd NT 1# 3x10 B NT 1# 2x10 B 1# 2x10 " "B 1# 2x10 B 2x15 B 2x15 B 2x15 B 2x10 B 2x10 B Next    Clams NT Next  NT 2 x 15 B  SL RTB  RTB   2x15 B 3x10 RTB B 2x10 RTB B Next  2x10 B RTB Next      Hip add    --   -- - 15x5''  D/c next 15x5'' w/ ball 15x5'' w/ ball Next    Bridges   NT 2x20 2x20 2x15 2x15 2x15 2x15  3x10 2x10 Next    SL inversion seated 2x10 R             SL eversion seated 2x10 R     --         Prone:       --         Quad stretch Next  Next  NT 2x30'' 2x30'' 30"x3 B 3x30"  3x30"  Next  Next       HS curls    ---   --                          Seated:                LAQ    --  -- --  -- 2x10 B 1# 2x10 B Next    Towel toe scruntches    --  -- --  -- 2x10 B 1# 2x10 B Next    Towel swipes 2x10 R               Standing:                Heel raises 3x10 DL 3x10 DL NT 2x15 2x15 2x15 3x10 3x10 3x10 2x10 2x10 DL Next    Calf stretch on fitter 3x30'' DL 3x30'' DL NT 3x30'' DL 3x30'' DL 30"x3 DL  fitter 3x30" DL on fitter 3x30" DL 3x30'' DL 3x30'' DL 3x30'' DL Next    Steamboats 2x15 B RTC NT NT 2x15 RTC 2x15 RTC 2x12 RTC 2x10 B RTC 2x10 B RTC NT 2x10 B RTC 2x10 B Next    TKEs   Hold  15x5'' B   ---   15x5'' B Next      Step ups Next  NT NT 2x10 L1 B 2x10 L1 B -- declined - Hold for now 2x10 B L1 2x10 B L1  1 HR    Step downs  NT NT 2x10 B L1 2x10 B L1 -- declined - Hold for now Next      SLS Next    --   --         Tandem walking oot 3 laps in //  BUE use  --   --         Cybex HS curls Next  NT NT 3.0 DL  3x10 NT 3.0 DL  3x10 2x10 DL  3.0  NT 2x10 DL  3.0      Cybex LAQ    --   --         Leg Press 2x10 DL  6.0plates  S=5 2x10 DL  5.5 plates  S=5 NT 5.0 DL w/ ball 3x10 DL 5.0 DL w/ ball 3x10 DL 4.0 W/ball  2x15 DL 3x10 DL 4.0 w/ ball squeeze 3x10 DL 4.0  3x10 DL  4 plates Next   Next    CP    --  -- --         INITIALS DAMARIS DAMARIS OCAMPO FS DAMARIS  1/6 FS CK DAMARIS DAMARIS DAMARIS DAMARIS       Assessment:     Pt demo increased muscle fatigue after session, and requires mod cues for hip and ankle disassociation for seated ankle exercises.    Patient Education/Response: "     CONT HEP and stretches daily as needed when feeling stiff.     Plans and Goals:     Cont per POC, progress per pt tolerance.    GOALS: Short Term Goals:  4 weeks  1. Pt will demo good seated and standing posture.  2. Increase strength by 1/3 MMT grade in BLE  to increase tolerance for ADL and work activities.  3. Pt to tolerate HEP to improve ROM and independence with ADL's.     Long Term Goals: 8 weeks  1.Report decreased B knee, L hip, and R calf pain </= 3/10  to increase tolerance for ADLs.  2.Patient goal: Not have pain in my legs  3.Increase strength to >/= 4+/5 in BLE to increase tolerance for ADL and work activities.  4. Pt will report at </=59% impaired on KNEE FOTO to demonstrate increase in LE function with every day tasks.   5. Pt will negotiate 8 steps and ambulate community distances at >/= Mod I for increased functional mobility.  6. Increase knee and lumbar ROM to WNL in order to be able to perform ADLs without difficulty.  7. Pt will ambulate with min gait deviations and without AD at home for increased functional mobility.

## 2018-10-15 ENCOUNTER — CLINICAL SUPPORT (OUTPATIENT)
Dept: REHABILITATION | Facility: HOSPITAL | Age: 63
End: 2018-10-15
Payer: MEDICAID

## 2018-10-15 DIAGNOSIS — M62.81 DECREASED MUSCLE STRENGTH: ICD-10-CM

## 2018-10-15 DIAGNOSIS — M25.561 CHRONIC PAIN OF BOTH KNEES: ICD-10-CM

## 2018-10-15 DIAGNOSIS — Z74.09 DECREASED MOBILITY AND ENDURANCE: ICD-10-CM

## 2018-10-15 DIAGNOSIS — G89.29 CHRONIC PAIN OF BOTH KNEES: ICD-10-CM

## 2018-10-15 DIAGNOSIS — M25.562 CHRONIC PAIN OF BOTH KNEES: ICD-10-CM

## 2018-10-15 PROCEDURE — 97110 THERAPEUTIC EXERCISES: CPT | Mod: PN

## 2018-10-15 NOTE — PLAN OF CARE
Date: 10/15/2018      PHYSICAL THERAPY UPDATED PLAN OF TREATMENT    Patient name: Mali Dias  Onset Date:  2016  SOC Date:  8/14/18  Primary Diagnosis:   M67.01 (ICD-10-CM) - Contracture of right Achilles tendon   R20.0 (ICD-10-CM) - Numbness of right lower extremity   M17.0 (ICD-10-CM) - Primary osteoarthritis of both knees   M79.661 (ICD-10-CM) - Right calf pain     Treatment Diagnosis:    1. Decreased muscle strength     2. Decreased mobility and endurance     3. Chronic pain of both knees       Referring Physician: Park Wayne PA-C  Certification Period:  10/15/18 - 11/2/18  Precautions:  R radial fracture in 2015, HTN  Visits from SOC:  12  Functional Level Prior to SOC:  Range of Motion/Strength:   * = pain with testing  AROM: LUMBAR   Flexion 80%   Extension 80%   Right side bending 80%   Left side bending 80%   Right rotation 100%   Left rotation 100%      Hip   Right     Left   Pain/Dysfunction with Movement     AROM PROM MMT AROM PROM MMT     Flexion WFL WFL 4-/5 WFL WFL 4/5     Extension WFL WFL 2+/5 WFL WFL 2+/5     Abduction WFL WFL 3+/5 WFL WFL 4-/5     Adduction WFL WFL 4/5 WFL WFL 4/5     Internal rotation WFL WFL 4-/5 WFL WFL 4-/5     External rotation WFL WFL 4-/5 WFL WFL 4-/5        Knee   Right     Left   Pain/Dysfunction with Movement     AROM PROM MMT AROM PROM MMT     Flexion WFL WFL 4/5 WFL WFL 4+/5     Extension WFL WFL 4/5 WFL WFL 4/5        Ankle   Right     Left   Pain/Dysfunction with Movement     AROM PROM MMT AROM PROM MMT     Plantarflexion WFL WFL 4/5 WFL WFL 4/5     Dorsiflexion WFL WFL 4/5 WFL WFL 4/5     Inversion WFL WFL 4/5 WFL WFL 4/5     Eversion 75% WFL 4-/5 WFL WFL 4/5     Updated Subjective: Pt reports only stiffness in the knees this morning, no pain.  Updated Objective:   Range of Motion/Strength:   * = pain with testing  AROM: LUMBAR   Flexion 100%   Extension 80%   Right side bending 100%   Left side bending 90%   Right rotation 80%   Left rotation 100%      Hip    Right     Left   Pain/Dysfunction with Movement     AROM PROM MMT AROM PROM MMT     Flexion WFL WFL 4/5 WFL WFL 4+/5     Extension WFL WFL 3+/5 WFL WFL 3+/5     Abduction WFL WFL 4+/5 WFL WFL 4+/5     Adduction WFL WFL 4+/5 WFL WFL 4+/5     Internal rotation WFL WFL 4+/5 WFL WFL 4/5     External rotation WFL WFL 4+/5 WFL WFL 4-/5        Knee   Right     Left   Pain/Dysfunction with Movement     AROM PROM MMT AROM PROM MMT     Flexion WFL WFL 4+/5 WFL WFL 5/5     Extension WFL WFL 5/5 WFL WFL 5/5        Ankle   Right     Left   Pain/Dysfunction with Movement     AROM PROM MMT AROM PROM MMT     Plantarflexion WFL WFL 5/5 WFL WFL 5/5     Dorsiflexion WFL WFL 5/5 WFL WFL 5/5     Inversion WFL WFL 4+/5 WFL WFL 5/5     Eversion 75% WFL 5/5 WFL WFL 4+/5        Updated Assessment:  Pt progressing in pain control in B knees and R medial lower leg. Pt demo improved MMT testing with minimal to no pain. Pt would benefit from cont skilled PT in order to further control R medial lower leg pain that currently appears to be posterior tibialis tendinosis.     Previous Short/Long Term Goals Status:   GOALS: Short Term Goals:  4 weeks  1. Pt will demo good seated and standing posture. - met  2. Increase strength by 1/3 MMT grade in BLE  to increase tolerance for ADL and work activities. - met  3. Pt to tolerate HEP to improve ROM and independence with ADL's. - met     Long Term Goals: 8 weeks  1.Report decreased B knee, L hip, and R calf pain </= 3/10  to increase tolerance for ADLs. - met  2.Patient goal: Not have pain in my legs - not met  3.Increase strength to >/= 4+/5 in BLE to increase tolerance for ADL and work activities. - not met  4. Pt will report at </=59% impaired on KNEE FOTO to demonstrate increase in LE function with every day tasks. - not met  5. Pt will negotiate 8 steps and ambulate community distances at >/= Mod I for increased functional mobility. - met  6. Increase knee and lumbar ROM to WNL in order to be able  to perform ADLs without difficulty. - not met  7. Pt will ambulate with min gait deviations and without AD at home for increased functional mobility. - met    New Short Term Goals Status:  See above  Long Term Goal Status:   continue per initial plan of care.  Reasons for Recertification of Therapy:   UPOC    Certification Period: 10/15/18 - 11/2/18  Recommended Treatment Plan: 2 times per week for 3 weeks: Aquatic Therapy, Cervical/Lumbar Traction, Electrical Stimulation IFC/Russian, Gait Training, Manual Therapy, Moist Heat/ Ice, Neuromuscular Re-ed, Orthotic Management and Training, Patient Education, Self Care, Therapeutic Activites and Therapeutic Exercise  Other Recommendations: FRANCISCA MCDONALD, PT  10/15/2018      I CERTIFY THE NEED FOR THESE SERVICES FURNISHED UNDER THIS PLAN OF TREATMENT AND WHILE UNDER MY CARE    Physician's comments: ________________________________________________________________________________________________________________________________________________      Physician's Name: ___________________________________

## 2018-10-15 NOTE — PROGRESS NOTES
"DAILY TREATMENT NOTE    DATE: 10/15/2018    Start Time:  8:05  Stop Time:  9:00    PROCEDURES:    TIMED  Procedure Min.   Supervised TE 25   TE 25   TA              UNTIMED  Procedure Min.   Bike 5         Total Timed Minutes: 25  Total Timed Units:  2  Total Untimed Units:  0  Charges Billed/# of units:  2 TE      Progress/Current Status    Subjective:     Patient ID: Mali Dias is a 63 y.o. female.  Diagnosis:   1. Decreased muscle strength     2. Decreased mobility and endurance     3. Chronic pain of both knees       Pain: 0/10  Pt reports only stiffness in the knees this morning, no pain.    Objective:     Bike x 5 mins to decrease B ankle and knee stiffness and increased BLE blood flow. Pt then performed TE x 25 mins including tests and measures for UPOC today, and then supervised TE x 25 mins per log below.    DATE 10/15/18 10/10/18 10/8/18 10/4/18 10/2/18 9/24/18 9/21/18 9/18/18 9/13/18 9/11/18 9/7/18 9/5/18 8/14/2018   VISIT 13 12 11 10 9 NEXT 8 7 6 5 4 3 2 1   POC   10/14/18                 FOTO 13 12 11 10/10 9/10 NEXT 8/10 7/10 Done 6/10 5/5 4/5 3/5 2/5 1/5   Bike 5' 5' 5' 5' 5' 5' 5' 5'  5' 5' 5' Next    MHP     --  -- --         MT     --  -- --         Stretches:                 HS stretch 3x30'' Next  3x30'' B w/ strap NT HEP supine   3 x 30'' stairs 2x30'' B  D/c  30"x3 B 3 x 30'' 2x45"  2x45'' B 3x30'' B 3x30'' B     Piriformis stretch   -- NT 2x30'' B 2x30'' B           Supine:                 Quad sets   -- NT 5" hold  2x10 NT 5" hold  2x10 2x10 B 5'' holds  NT 2x10 B 5'' holds 2x10 5'' holds Next B   SLR   --  2x10 1# B 2x10 1# B 2x15 3x10 B 3x10 B 3x10 B 2x10 B 2x10 B Next    SAQ     --   --   D/c 2x15 B  D/c next     Knee flexion stretch     --   --         Heel slides     --   --         SL hip abd  NT 1# 3x10 B NT 1# 2x10 B 1# 2x10 B 1# 2x10 B 2x15 B 2x15 B 2x15 B 2x10 B 2x10 B Next    Clams  NT Next  NT 2 x 15 B  SL RTB  RTB   2x15 B 3x10 RTB B 2x10 RTB B Next  2x10 B RTB Next      Hip add " "    --   -- - 15x5''  D/c next 15x5'' w/ ball 15x5'' w/ ball Next    Bridges 2x15   NT 2x20 2x20 2x15 2x15 2x15 2x15  3x10 2x10 Next    SL inversion  seated 2x10 R             SL eversion  seated 2x10 R     --         Prone:        --         Quad stretch  Next  Next  NT 2x30'' 2x30'' 30"x3 B 3x30"  3x30"  Next  Next       HS curls     ---   --                           Seated:                 LAQ     --  -- --  -- 2x10 B 1# 2x10 B Next    Towel toe scruntches     --  -- --  -- 2x10 B 1# 2x10 B Next    Towel swipes NT 2x10 R               Standing:                 Heel raises 2x15 DL 3x10 DL 3x10 DL NT 2x15 2x15 2x15 3x10 3x10 3x10 2x10 2x10 DL Next    Calf stretch on fitter 3x30'' DL 3x30'' DL 3x30'' DL NT 3x30'' DL 3x30'' DL 30"x3 DL  fitter 3x30" DL on fitter 3x30" DL 3x30'' DL 3x30'' DL 3x30'' DL Next    Steamboats 2x15 RTC B 2x15 B RTC NT NT 2x15 RTC 2x15 RTC 2x12 RTC 2x10 B RTC 2x10 B RTC NT 2x10 B RTC 2x10 B Next    TKEs --   Hold  15x5'' B   ---   15x5'' B Next      Step ups 2x10 B stairs Next  NT NT 2x10 L1 B 2x10 L1 B -- declined - Hold for now 2x10 B L1 2x10 B L1  1 HR    Step downs NT  NT NT 2x10 B L1 2x10 B L1 -- declined - Hold for now Next      SLS 2x30'' B Next    --   --         Tandem walking Next  oot 3 laps in //  BUE use  --   --         Cybex HS curls Next  Next  NT NT 3.0 DL  3x10 NT 3.0 DL  3x10 2x10 DL  3.0  NT 2x10 DL  3.0      Cybex LAQ Next     --   --         Leg Press Next  2x10 DL  6.0plates  S=5 2x10 DL  5.5 plates  S=5 NT 5.0 DL w/ ball 3x10 DL 5.0 DL w/ ball 3x10 DL 4.0 W/ball  2x15 DL 3x10 DL 4.0 w/ ball squeeze 3x10 DL 4.0  3x10 DL  4 plates Next   Next    CP     --  -- --         INITIALS DAMARIS OCAMPO FS DAMARIS   FS CK DAMARIS OCAMPO     Assessment:     Pt demo improved BLE strength and cont to present with slight cogwheel MMT for R>L LEs. Pt tolerated added TE well, with cues to perform per pain tolerance and use hands for balance and  BLE WB as needed. Pt agreed " for UPOC and d/c on 10/29/18 two weeks from now.    Patient Education/Response:     CONT HEP and stretches daily as needed when feeling stiff.     Plans and Goals:     Cont per POC, progress per pt tolerance.    GOALS: Short Term Goals:  4 weeks  1. Pt will demo good seated and standing posture.  2. Increase strength by 1/3 MMT grade in BLE  to increase tolerance for ADL and work activities.  3. Pt to tolerate HEP to improve ROM and independence with ADL's.     Long Term Goals: 8 weeks  1.Report decreased B knee, L hip, and R calf pain </= 3/10  to increase tolerance for ADLs.  2.Patient goal: Not have pain in my legs  3.Increase strength to >/= 4+/5 in BLE to increase tolerance for ADL and work activities.  4. Pt will report at </=59% impaired on KNEE FOTO to demonstrate increase in LE function with every day tasks.   5. Pt will negotiate 8 steps and ambulate community distances at >/= Mod I for increased functional mobility.  6. Increase knee and lumbar ROM to WNL in order to be able to perform ADLs without difficulty.  7. Pt will ambulate with min gait deviations and without AD at home for increased functional mobility.

## 2019-07-25 LAB — CRC RECOMMENDATION EXT: NORMAL

## 2023-04-24 ENCOUNTER — TELEPHONE (OUTPATIENT)
Dept: ORTHOPEDICS | Facility: CLINIC | Age: 68
End: 2023-04-24
Payer: MEDICARE

## 2023-04-24 DIAGNOSIS — M51.36 DDD (DEGENERATIVE DISC DISEASE), LUMBAR: Primary | ICD-10-CM

## 2023-04-25 ENCOUNTER — OFFICE VISIT (OUTPATIENT)
Dept: ORTHOPEDICS | Facility: CLINIC | Age: 68
End: 2023-04-25
Payer: MEDICARE

## 2023-04-25 ENCOUNTER — HOSPITAL ENCOUNTER (OUTPATIENT)
Dept: RADIOLOGY | Facility: HOSPITAL | Age: 68
Discharge: HOME | End: 2023-04-25
Attending: ORTHOPAEDIC SURGERY
Payer: MEDICARE

## 2023-04-25 VITALS — HEIGHT: 63 IN | BODY MASS INDEX: 46.37 KG/M2 | WEIGHT: 261.69 LBS

## 2023-04-25 DIAGNOSIS — M51.36 DDD (DEGENERATIVE DISC DISEASE), LUMBAR: ICD-10-CM

## 2023-04-25 DIAGNOSIS — M54.9 DORSALGIA, UNSPECIFIED: ICD-10-CM

## 2023-04-25 DIAGNOSIS — M43.10 DEGENERATIVE SPONDYLOLISTHESIS: Primary | ICD-10-CM

## 2023-04-25 DIAGNOSIS — M47.816 LUMBAR SPONDYLOSIS: ICD-10-CM

## 2023-04-25 PROCEDURE — 99204 OFFICE O/P NEW MOD 45 MIN: CPT | Mod: S$GLB,,, | Performed by: ORTHOPAEDIC SURGERY

## 2023-04-25 PROCEDURE — 1160F RVW MEDS BY RX/DR IN RCRD: CPT | Mod: CPTII,S$GLB,, | Performed by: ORTHOPAEDIC SURGERY

## 2023-04-25 PROCEDURE — 1159F MED LIST DOCD IN RCRD: CPT | Mod: CPTII,S$GLB,, | Performed by: ORTHOPAEDIC SURGERY

## 2023-04-25 PROCEDURE — 3288F PR FALLS RISK ASSESSMENT DOCUMENTED: ICD-10-PCS | Mod: CPTII,S$GLB,, | Performed by: ORTHOPAEDIC SURGERY

## 2023-04-25 PROCEDURE — 1101F PT FALLS ASSESS-DOCD LE1/YR: CPT | Mod: CPTII,S$GLB,, | Performed by: ORTHOPAEDIC SURGERY

## 2023-04-25 PROCEDURE — 99999 PR PBB SHADOW E&M-EST. PATIENT-LVL IV: CPT | Mod: PBBFAC,,, | Performed by: ORTHOPAEDIC SURGERY

## 2023-04-25 PROCEDURE — 4010F PR ACE/ARB THEARPY RXD/TAKEN: ICD-10-PCS | Mod: CPTII,S$GLB,, | Performed by: ORTHOPAEDIC SURGERY

## 2023-04-25 PROCEDURE — 99999 PR PBB SHADOW E&M-EST. PATIENT-LVL IV: ICD-10-PCS | Mod: PBBFAC,,, | Performed by: ORTHOPAEDIC SURGERY

## 2023-04-25 PROCEDURE — 3288F FALL RISK ASSESSMENT DOCD: CPT | Mod: CPTII,S$GLB,, | Performed by: ORTHOPAEDIC SURGERY

## 2023-04-25 PROCEDURE — 1160F PR REVIEW ALL MEDS BY PRESCRIBER/CLIN PHARMACIST DOCUMENTED: ICD-10-PCS | Mod: CPTII,S$GLB,, | Performed by: ORTHOPAEDIC SURGERY

## 2023-04-25 PROCEDURE — 1101F PR PT FALLS ASSESS DOC 0-1 FALLS W/OUT INJ PAST YR: ICD-10-PCS | Mod: CPTII,S$GLB,, | Performed by: ORTHOPAEDIC SURGERY

## 2023-04-25 PROCEDURE — 4010F ACE/ARB THERAPY RXD/TAKEN: CPT | Mod: CPTII,S$GLB,, | Performed by: ORTHOPAEDIC SURGERY

## 2023-04-25 PROCEDURE — 72110 XR LUMBAR SPINE AP AND LAT WITH FLEX/EXT: ICD-10-PCS | Mod: 26,,, | Performed by: RADIOLOGY

## 2023-04-25 PROCEDURE — 72110 X-RAY EXAM L-2 SPINE 4/>VWS: CPT | Mod: 26,,, | Performed by: RADIOLOGY

## 2023-04-25 PROCEDURE — 1125F AMNT PAIN NOTED PAIN PRSNT: CPT | Mod: CPTII,S$GLB,, | Performed by: ORTHOPAEDIC SURGERY

## 2023-04-25 PROCEDURE — 1159F PR MEDICATION LIST DOCUMENTED IN MEDICAL RECORD: ICD-10-PCS | Mod: CPTII,S$GLB,, | Performed by: ORTHOPAEDIC SURGERY

## 2023-04-25 PROCEDURE — 1125F PR PAIN SEVERITY QUANTIFIED, PAIN PRESENT: ICD-10-PCS | Mod: CPTII,S$GLB,, | Performed by: ORTHOPAEDIC SURGERY

## 2023-04-25 PROCEDURE — 99204 PR OFFICE/OUTPT VISIT, NEW, LEVL IV, 45-59 MIN: ICD-10-PCS | Mod: S$GLB,,, | Performed by: ORTHOPAEDIC SURGERY

## 2023-04-25 PROCEDURE — 72110 X-RAY EXAM L-2 SPINE 4/>VWS: CPT | Mod: TC

## 2023-04-25 PROCEDURE — 3008F BODY MASS INDEX DOCD: CPT | Mod: CPTII,S$GLB,, | Performed by: ORTHOPAEDIC SURGERY

## 2023-04-25 PROCEDURE — 3008F PR BODY MASS INDEX (BMI) DOCUMENTED: ICD-10-PCS | Mod: CPTII,S$GLB,, | Performed by: ORTHOPAEDIC SURGERY

## 2023-04-25 RX ORDER — LEVOTHYROXINE SODIUM 50 UG/1
50 TABLET ORAL
COMMUNITY
Start: 2023-02-12

## 2023-04-25 RX ORDER — NAPROXEN 500 MG/1
TABLET ORAL
COMMUNITY
Start: 2023-04-19

## 2023-04-25 RX ORDER — ALBUTEROL SULFATE 90 UG/1
AEROSOL, METERED RESPIRATORY (INHALATION)
COMMUNITY

## 2023-04-25 RX ORDER — AZELASTINE 1 MG/ML
SPRAY, METERED NASAL
COMMUNITY
Start: 2023-04-12

## 2023-04-25 RX ORDER — AZELASTINE 1 MG/ML
SPRAY, METERED NASAL
COMMUNITY

## 2023-04-25 RX ORDER — THYROID 90 MG/1
TABLET ORAL
COMMUNITY

## 2023-04-25 RX ORDER — ALBUTEROL SULFATE 90 UG/1
AEROSOL, METERED RESPIRATORY (INHALATION)
COMMUNITY
Start: 2023-04-17

## 2023-04-25 RX ORDER — NAPROXEN 500 MG/1
TABLET ORAL
COMMUNITY

## 2023-04-25 RX ORDER — LEVOTHYROXINE SODIUM 25 UG/1
TABLET ORAL
COMMUNITY
Start: 2023-01-17

## 2023-04-25 RX ORDER — CETIRIZINE HYDROCHLORIDE 10 MG/1
10 TABLET ORAL
COMMUNITY

## 2023-04-25 RX ORDER — PANTOPRAZOLE SODIUM 40 MG/1
TABLET, DELAYED RELEASE ORAL
COMMUNITY

## 2023-04-25 RX ORDER — PROMETHAZINE HYDROCHLORIDE AND DEXTROMETHORPHAN HYDROBROMIDE 6.25; 15 MG/5ML; MG/5ML
SYRUP ORAL
COMMUNITY

## 2023-04-25 RX ORDER — METHOCARBAMOL 500 MG/1
500 TABLET, FILM COATED ORAL 3 TIMES DAILY
Qty: 60 TABLET | Refills: 1 | Status: SHIPPED | OUTPATIENT
Start: 2023-04-25

## 2023-04-25 RX ORDER — NYSTATIN 100000 [USP'U]/ML
SUSPENSION ORAL
COMMUNITY

## 2023-04-25 RX ORDER — FLUCONAZOLE 150 MG/1
TABLET ORAL
COMMUNITY
Start: 2022-12-20

## 2023-04-25 RX ORDER — HYDROXYZINE HYDROCHLORIDE 25 MG/1
TABLET, FILM COATED ORAL
COMMUNITY

## 2023-04-25 RX ORDER — AMOXICILLIN AND CLAVULANATE POTASSIUM 875; 125 MG/1; MG/1
TABLET, FILM COATED ORAL
COMMUNITY

## 2023-04-25 RX ORDER — LISINOPRIL AND HYDROCHLOROTHIAZIDE 20; 25 MG/1; MG/1
TABLET ORAL
COMMUNITY

## 2023-04-25 RX ORDER — GABAPENTIN 300 MG/1
300 CAPSULE ORAL 3 TIMES DAILY
Qty: 60 CAPSULE | Refills: 1 | Status: SHIPPED | OUTPATIENT
Start: 2023-04-25

## 2023-04-25 RX ORDER — CIPROFLOXACIN 500 MG/1
TABLET ORAL
COMMUNITY

## 2023-04-25 RX ORDER — CETIRIZINE HYDROCHLORIDE 10 MG/1
10 TABLET ORAL
COMMUNITY
Start: 2023-02-02

## 2023-04-25 RX ORDER — PANTOPRAZOLE SODIUM 40 MG/1
40 TABLET, DELAYED RELEASE ORAL
COMMUNITY
Start: 2022-12-05

## 2023-04-25 RX ORDER — LISINOPRIL 10 MG/1
TABLET ORAL
COMMUNITY

## 2023-04-25 RX ORDER — LISINOPRIL 40 MG/1
TABLET ORAL
COMMUNITY

## 2023-04-25 RX ORDER — LIOTHYRONINE SODIUM 25 UG/1
25 TABLET ORAL EVERY MORNING
COMMUNITY
Start: 2023-04-19

## 2023-04-25 RX ORDER — THYROID 30 MG/1
TABLET ORAL
COMMUNITY

## 2023-04-25 RX ORDER — TRIAMCINOLONE ACETONIDE 1 MG/G
CREAM TOPICAL
COMMUNITY

## 2023-04-25 RX ORDER — NAPROXEN 500 MG/1
500 TABLET ORAL
COMMUNITY

## 2023-04-25 RX ORDER — MONTELUKAST SODIUM 10 MG/1
TABLET ORAL
COMMUNITY

## 2023-04-25 RX ORDER — MONTELUKAST SODIUM 10 MG/1
10 TABLET ORAL
COMMUNITY
Start: 2023-04-19

## 2023-04-25 RX ORDER — HYDROCHLOROTHIAZIDE 25 MG/1
TABLET ORAL
COMMUNITY

## 2023-04-25 RX ORDER — TOPIRAMATE 50 MG/1
TABLET, FILM COATED ORAL
COMMUNITY
Start: 2023-01-17

## 2023-04-25 RX ORDER — FLUTICASONE FUROATE, UMECLIDINIUM BROMIDE AND VILANTEROL TRIFENATATE 200; 62.5; 25 UG/1; UG/1; UG/1
POWDER RESPIRATORY (INHALATION)
COMMUNITY
Start: 2023-04-25

## 2023-04-25 RX ORDER — DOXYCYCLINE 100 MG/1
CAPSULE ORAL
COMMUNITY

## 2023-04-25 RX ORDER — LISINOPRIL AND HYDROCHLOROTHIAZIDE 10; 12.5 MG/1; MG/1
1 TABLET ORAL
COMMUNITY
Start: 2023-04-19

## 2023-04-25 RX ORDER — FLUTICASONE PROPIONATE 50 MCG
SPRAY, SUSPENSION (ML) NASAL
COMMUNITY

## 2023-04-25 NOTE — PROGRESS NOTES
DATE: 4/25/2023  PATIENT: Mali Dias    Attending Physician: Nestor Levine M.D.    CHIEF COMPLAINT: LBP and BLE pain    HISTORY:  Mali Dias is a 67 y.o. female presents for initial evaluation of low back and b/l leg pain (Back - 8, Leg - 8). The pain has been present since 2011. The patient describes the pain as dull and it radiates posterolaterally down BLE to the toes.  The pain is worse with activity and improved by rest. There is BLE associated numbness and tingling. There is BLE subjective weakness. Prior treatments have included meds (naproxen and tylenol), PT, but no SUE or surgery.    The Patient denies myelopathic symptoms such as handwriting changes or difficulty with buttons/coins/keys. Denies perineal paresthesias, bowel/bladder dysfunction.    The patient does not smoke, have DM or endorse IVDU. The patient is not on any blood thinners and does not take chronic narcotics. She is disabled due to her legs.    PAST MEDICAL/SURGICAL HISTORY:  History reviewed. No pertinent past medical history.  History reviewed. No pertinent surgical history.    Current Medications:   Current Outpatient Medications:     albuterol (PROVENTIL/VENTOLIN HFA) 90 mcg/actuation inhaler, Inhale into the lungs., Disp: , Rfl:     albuterol (PROVENTIL/VENTOLIN HFA) 90 mcg/actuation inhaler, Ventolin HFA 90 mcg/actuation aerosol inhaler, Disp: , Rfl:     amoxicillin-clavulanate 875-125mg (AUGMENTIN) 875-125 mg per tablet, amoxicillin 875 mg-potassium clavulanate 125 mg tablet  Take 1 tablet every 12 hours by oral route., Disp: , Rfl:     azelastine (ASTELIN) 137 mcg (0.1 %) nasal spray, , Disp: , Rfl:     azelastine (ASTELIN) 137 mcg (0.1 %) nasal spray, azelastine 137 mcg (0.1 %) nasal spray aerosol  USE 2 SPRAY(S) IN EACH NOSTRIL TWICE DAILY, Disp: , Rfl:     cetirizine (ZYRTEC) 10 MG tablet, Take 10 mg by mouth., Disp: , Rfl:     cetirizine (ZYRTEC) 10 MG tablet, Take 10 mg by mouth., Disp: , Rfl:     ciprofloxacin HCl (CIPRO)  500 MG tablet, ciprofloxacin 500 mg tablet, Disp: , Rfl:     doxycycline (MONODOX) 100 MG capsule, doxycycline monohydrate 100 mg capsule, Disp: , Rfl:     fluconazole (DIFLUCAN) 150 MG Tab, TAKE 1 TABLET BY MOUTH FOR 1 DOSE. MAY REPEAT DOSE IN THREE DAYS IF SYMPTOMS PERSIST, Disp: , Rfl:     fluticasone furoate (ARNUITY ELLIPTA) 200 mcg/actuation inhaler, Arnuity Ellipta 200 mcg/actuation powder for inhalation, Disp: , Rfl:     fluticasone propionate (FLONASE) 50 mcg/actuation nasal spray, fluticasone propionate 50 mcg/actuation nasal spray,suspension, Disp: , Rfl:     hydroCHLOROthiazide (HYDRODIURIL) 25 MG tablet, hydrochlorothiazide 25 mg tablet, Disp: , Rfl:     hydrOXYzine HCL (ATARAX) 25 MG tablet, hydroxyzine HCl 25 mg tablet, Disp: , Rfl:     levothyroxine (SYNTHROID) 25 MCG tablet, , Disp: , Rfl:     levothyroxine (SYNTHROID) 50 MCG tablet, Take 50 mcg by mouth., Disp: , Rfl:     liothyronine (CYTOMEL) 25 MCG Tab, Take 25 mcg by mouth every morning., Disp: , Rfl:     lisinopriL (PRINIVIL,ZESTRIL) 40 MG tablet, lisinopril 40 mg tablet, Disp: , Rfl:     lisinopriL 10 MG tablet, lisinopril 10 mg tablet, Disp: , Rfl:     lisinopriL-hydrochlorothiazide (PRINZIDE,ZESTORETIC) 10-12.5 mg per tablet, Take 1 tablet by mouth., Disp: , Rfl:     lisinopriL-hydrochlorothiazide (PRINZIDE,ZESTORETIC) 20-25 mg Tab, lisinopril 20 mg-hydrochlorothiazide 25 mg tablet, Disp: , Rfl:     montelukast (SINGULAIR) 10 mg tablet, Take 10 mg by mouth., Disp: , Rfl:     montelukast (SINGULAIR) 10 mg tablet, montelukast 10 mg tablet, Disp: , Rfl:     naproxen (EC NAPROSYN) 500 MG EC tablet, Take 500 mg by mouth., Disp: , Rfl:     naproxen (NAPROSYN) 500 MG tablet, TAKE 1 TABLET BY MOUTH TWICE DAILY WITH FOOD AS NEEDED FOR PAIN. NO MORE THAN 2 DAYS INAROW, Disp: , Rfl:     naproxen (NAPROSYN) 500 MG tablet, naproxen 500 mg tablet, Disp: , Rfl:     nystatin (MYCOSTATIN) 100,000 unit/mL suspension, nystatin 100,000 unit/mL oral suspension,  Disp: , Rfl:     pantoprazole (PROTONIX) 40 MG tablet, Take 40 mg by mouth., Disp: , Rfl:     pantoprazole (PROTONIX) 40 MG tablet, pantoprazole 40 mg tablet,delayed release, Disp: , Rfl:     promethazine-dextromethorphan (PROMETHAZINE-DM) 6.25-15 mg/5 mL Syrp, promethazine-DM 6.25 mg-15 mg/5 mL oral syrup, Disp: , Rfl:     thyroid, pork, (ARMOUR THYROID) 30 mg Tab, NP Thyroid 30 mg tablet, Disp: , Rfl:     thyroid, pork, (ARMOUR THYROID) 90 mg Tab, NP Thyroid 90 mg tablet, Disp: , Rfl:     topiramate (TOPAMAX) 50 MG tablet, , Disp: , Rfl:     TRELEGY ELLIPTA 200-62.5-25 mcg inhaler, , Disp: , Rfl:     triamcinolone acetonide 0.1% (KENALOG) 0.1 % cream, triamcinolone acetonide 0.1 % topical cream, Disp: , Rfl:     gabapentin (NEURONTIN) 300 MG capsule, Take 1 capsule (300 mg total) by mouth 3 (three) times daily., Disp: 60 capsule, Rfl: 1    methocarbamoL (ROBAXIN) 500 MG Tab, Take 1 tablet (500 mg total) by mouth 3 (three) times daily., Disp: 60 tablet, Rfl: 1    Social History:   Social History     Socioeconomic History    Marital status: Single   Tobacco Use    Smoking status: Never    Smokeless tobacco: Never   Substance and Sexual Activity    Alcohol use: Not Currently       REVIEW OF SYSTEMS:  Constitution: Negative. Negative for chills, fever and night sweats.   Cardiovascular: Negative for chest pain and syncope.   Respiratory: Negative for cough and shortness of breath.   Gastrointestinal: See HPI. Negative for nausea/vomiting. Negative for abdominal pain.  Genitourinary: See HPI. Negative for discoloration or dysuria.  Hematologic/Lymphatic: negative for bleeding/clotting disorders.   Musculoskeletal: Negative for falls and muscle weakness.   Neurological: See HPI. no history of seizures. no history of cranial surgery or shunts.  Neurological: See HPI. No seizures.   Endocrine: Negative for polydipsia, polyphagia and polyuria.   Allergic/Immunologic: Negative for hives and persistent infections.    "  EXAM:  Ht 5' 2.5" (1.588 m)   Wt 118.7 kg (261 lb 11 oz)   BMI 47.10 kg/m²     PHYSICAL EXAMINATION:    General: The patient is a 67 y.o. female in no apparent distress, the patient is orientatied to person, place and time.  Psych: Normal mood and affect  HEENT: Vision grossly intact, hearing intact to the spoken word.  Lungs: Respirations unlabored.  Gait: Normal station and gait, no difficulty with toe or heel walk.   Skin: Dorsal lumbar skin negative for rashes, lesions, hairy patches and surgical scars. There is lower lumbar tenderness to palpation.  Range of motion: Lumbar range of motion is acceptable.  Spinal Balance: Global saggital and coronal spinal balance acceptable, no significant for scoliosis and kyphosis.  Musculoskeletal: No pain with the range of motion of the bilateral hips. No trochanteric tenderness to palpation.  Vascular: Bilateral lower extremities warm and well perfused, Dorsalis pedis pulses 2+ bilaterally.  Neurological: 4/5 strength and tone in all major motor groups in the bilateral lower extremities except for PF. Normal sensation to light touch in the L2-S1 dermatomes bilaterally.  Deep tendon reflexes symmetric 2+ in the bilateral lower extremities.  Negative Babinski bilaterally. Straight leg raise negative bilaterally.    IMAGING:   Today I independently reviewed the following images and my interpretations are as follows:    AP, Lat and Flex/Ex  upright L-spine demonstrate lumbar spondylosis and DDD. L4-5 anterolisthesis measuring 6mm.      Body mass index is 47.1 kg/m².  No results found for: HGBA1C    ASSESSMENT/PLAN:    Mali was seen today for establish care and pain.    Diagnoses and all orders for this visit:    Degenerative spondylolisthesis  -     methocarbamoL (ROBAXIN) 500 MG Tab; Take 1 tablet (500 mg total) by mouth 3 (three) times daily.  -     gabapentin (NEURONTIN) 300 MG capsule; Take 1 capsule (300 mg total) by mouth 3 (three) times daily.    DDD (degenerative " disc disease), lumbar    Lumbar spondylosis    Dorsalgia, unspecified  -     MRI Lumbar Spine Without Contrast; Future      Follow up in about 4 weeks (around 5/23/2023).    Patient has lumbar degenerative spondylolisthesis and BLE radiculopathy. I discussed the natural history of their diagnoses as well as surgical and nonsurgical treatment options. I educated the patient on the importance of core/back strengthening, correct posture, bending/lifting ergonomics, and low-impact aerobic exercises (walking, elliptical, and aquatherapy). I prescribed gabapentin and robaxin. Continue medications. I ordered lumbar MRI to evaluate stenosis. Patient will follow up in 2 weeks for MRI review.    I provided the patient with a home exercise program. It is the [facility] spine conditioning program. Exercises include head rolls, kneeling back extension, sitting rotation stretch, modified seated side straddle, knee to chest, bird dog, plank, modified seated plank, hip bridges, abdominal bracing, and abdominal crunch. Pt will complete each exercise 5 times daily for 6-8 weeks.    Nestor Levine MD  Orthopaedic Spine Surgeon  Department of Orthopaedic Surgery  [phone]

## 2023-05-09 ENCOUNTER — HOSPITAL ENCOUNTER (OUTPATIENT)
Dept: RADIOLOGY | Facility: HOSPITAL | Age: 68
Discharge: HOME | End: 2023-05-09
Attending: ORTHOPAEDIC SURGERY
Payer: MEDICARE

## 2023-05-09 DIAGNOSIS — M54.9 DORSALGIA, UNSPECIFIED: ICD-10-CM

## 2023-05-09 PROCEDURE — 72148 MRI LUMBAR SPINE W/O DYE: CPT | Mod: TC

## 2023-05-09 PROCEDURE — 72148 MRI LUMBAR SPINE WITHOUT CONTRAST: ICD-10-PCS | Mod: 26,,, | Performed by: RADIOLOGY

## 2023-05-09 PROCEDURE — 72148 MRI LUMBAR SPINE W/O DYE: CPT | Mod: 26,,, | Performed by: RADIOLOGY

## 2023-05-23 ENCOUNTER — OFFICE VISIT (OUTPATIENT)
Dept: ORTHOPEDICS | Facility: CLINIC | Age: 68
End: 2023-05-23
Payer: MEDICARE

## 2023-05-23 VITALS — HEIGHT: 63 IN | BODY MASS INDEX: 45.82 KG/M2 | WEIGHT: 258.63 LBS

## 2023-05-23 DIAGNOSIS — M47.816 LUMBAR SPONDYLOSIS: ICD-10-CM

## 2023-05-23 DIAGNOSIS — M51.36 DDD (DEGENERATIVE DISC DISEASE), LUMBAR: ICD-10-CM

## 2023-05-23 DIAGNOSIS — M43.10 DEGENERATIVE SPONDYLOLISTHESIS: Primary | ICD-10-CM

## 2023-05-23 DIAGNOSIS — M54.16 LUMBAR RADICULOPATHY: ICD-10-CM

## 2023-05-23 PROCEDURE — 99999 PR PBB SHADOW E&M-EST. PATIENT-LVL V: CPT | Mod: PBBFAC,,, | Performed by: ORTHOPAEDIC SURGERY

## 2023-05-23 PROCEDURE — 1125F PR PAIN SEVERITY QUANTIFIED, PAIN PRESENT: ICD-10-PCS | Mod: CPTII,S$GLB,, | Performed by: ORTHOPAEDIC SURGERY

## 2023-05-23 PROCEDURE — 3288F FALL RISK ASSESSMENT DOCD: CPT | Mod: CPTII,S$GLB,, | Performed by: ORTHOPAEDIC SURGERY

## 2023-05-23 PROCEDURE — 3288F PR FALLS RISK ASSESSMENT DOCUMENTED: ICD-10-PCS | Mod: CPTII,S$GLB,, | Performed by: ORTHOPAEDIC SURGERY

## 2023-05-23 PROCEDURE — 99214 PR OFFICE/OUTPT VISIT, EST, LEVL IV, 30-39 MIN: ICD-10-PCS | Mod: S$GLB,,, | Performed by: ORTHOPAEDIC SURGERY

## 2023-05-23 PROCEDURE — 99999 PR PBB SHADOW E&M-EST. PATIENT-LVL V: ICD-10-PCS | Mod: PBBFAC,,, | Performed by: ORTHOPAEDIC SURGERY

## 2023-05-23 PROCEDURE — 1101F PR PT FALLS ASSESS DOC 0-1 FALLS W/OUT INJ PAST YR: ICD-10-PCS | Mod: CPTII,S$GLB,, | Performed by: ORTHOPAEDIC SURGERY

## 2023-05-23 PROCEDURE — 3008F PR BODY MASS INDEX (BMI) DOCUMENTED: ICD-10-PCS | Mod: CPTII,S$GLB,, | Performed by: ORTHOPAEDIC SURGERY

## 2023-05-23 PROCEDURE — 1101F PT FALLS ASSESS-DOCD LE1/YR: CPT | Mod: CPTII,S$GLB,, | Performed by: ORTHOPAEDIC SURGERY

## 2023-05-23 PROCEDURE — 99214 OFFICE O/P EST MOD 30 MIN: CPT | Mod: S$GLB,,, | Performed by: ORTHOPAEDIC SURGERY

## 2023-05-23 PROCEDURE — 1125F AMNT PAIN NOTED PAIN PRSNT: CPT | Mod: CPTII,S$GLB,, | Performed by: ORTHOPAEDIC SURGERY

## 2023-05-23 PROCEDURE — 1159F MED LIST DOCD IN RCRD: CPT | Mod: CPTII,S$GLB,, | Performed by: ORTHOPAEDIC SURGERY

## 2023-05-23 PROCEDURE — 1160F RVW MEDS BY RX/DR IN RCRD: CPT | Mod: CPTII,S$GLB,, | Performed by: ORTHOPAEDIC SURGERY

## 2023-05-23 PROCEDURE — 3008F BODY MASS INDEX DOCD: CPT | Mod: CPTII,S$GLB,, | Performed by: ORTHOPAEDIC SURGERY

## 2023-05-23 PROCEDURE — 4010F PR ACE/ARB THEARPY RXD/TAKEN: ICD-10-PCS | Mod: CPTII,S$GLB,, | Performed by: ORTHOPAEDIC SURGERY

## 2023-05-23 PROCEDURE — 1159F PR MEDICATION LIST DOCUMENTED IN MEDICAL RECORD: ICD-10-PCS | Mod: CPTII,S$GLB,, | Performed by: ORTHOPAEDIC SURGERY

## 2023-05-23 PROCEDURE — 4010F ACE/ARB THERAPY RXD/TAKEN: CPT | Mod: CPTII,S$GLB,, | Performed by: ORTHOPAEDIC SURGERY

## 2023-05-23 PROCEDURE — 1160F PR REVIEW ALL MEDS BY PRESCRIBER/CLIN PHARMACIST DOCUMENTED: ICD-10-PCS | Mod: CPTII,S$GLB,, | Performed by: ORTHOPAEDIC SURGERY

## 2023-05-23 NOTE — PROGRESS NOTES
"DATE: 5/23/2023  PATIENT: Mali Dias    Attending Physician: Nestor Levine M.D.    HISTORY:  Mali Dias is a 67 y.o. female who returns to me today for MRI review. Patient continues to have LBP that radiates posterolaterally down BLE to the toes. She has been taking meds and doing exercises without much relief. She said the gabapentin makes her drowsy.    The patient does not smoke, have DM or endorse IVDU. The patient is not on any blood thinners and does not take chronic narcotics. She is disabled due to her legs.    PMH/PSH/FamHx/SocHx:  Unchanged from prior visit    ROS:  Positive for LBP and BLE pain  Denies perineal paresthesias, bowel or bladder incontinence    EXAM:  Ht 5' 2.5" (1.588 m)   Wt 117.3 kg (258 lb 9.6 oz)   BMI 46.54 kg/m²     My physical examination was notable for the following findings: 4/5 strength and tone in all major motor groups in the bilateral lower extremities except for PF. Normal sensation to light touch in the L2-S1 dermatomes bilaterally.  Deep tendon reflexes symmetric 2+ in the bilateral lower extremities.    IMAGING:  Today I independently reviewed the following images and my interpretations are as follows:    Previous L-spine XRs showed lumbar spondylosis and DDD. L4-5 anterolisthesis measuring 6mm.       Lumbar MRI showed mild L4-5 central and b/l foraminal stenosis.    ASSESSMENT/PLAN:  Patient has lumbar degenerative spondylolisthesis and BLE radiculopathy. I discussed the natural history of their diagnoses as well as surgical and nonsurgical treatment options. I educated the patient on the importance of core/back strengthening, correct posture, bending/lifting ergonomics, and low-impact aerobic exercises (walking, elliptical, and aquatherapy). Continue medications and exercises. I referred pt to Pain Management for possible injections. Patient will follow up PRN. Last resort is L4-5 PLDF/TLIF but patient's BMI should be <40.    Nestor Levine MD  Orthopaedic Spine " Surgeon  Department of Orthopaedic Surgery  [phone]

## 2023-09-12 ENCOUNTER — HOSPITAL ENCOUNTER (OUTPATIENT)
Dept: RADIOLOGY | Facility: HOSPITAL | Age: 68
Discharge: HOME | End: 2023-09-12
Attending: FAMILY MEDICINE
Payer: MEDICARE

## 2023-09-12 DIAGNOSIS — M25.561 RIGHT KNEE PAIN: Primary | ICD-10-CM

## 2023-09-12 DIAGNOSIS — M25.562 LEFT KNEE PAIN: ICD-10-CM

## 2023-09-12 DIAGNOSIS — M25.561 RIGHT KNEE PAIN: ICD-10-CM

## 2023-09-12 PROCEDURE — 73562 X-RAY EXAM OF KNEE 3: CPT | Mod: 26,50,, | Performed by: RADIOLOGY

## 2023-09-12 PROCEDURE — 73562 XR KNEE 3 VIEW BILATERAL: ICD-10-PCS | Mod: 26,50,, | Performed by: RADIOLOGY

## 2023-09-12 PROCEDURE — 73562 X-RAY EXAM OF KNEE 3: CPT | Mod: TC,50

## 2024-01-02 ENCOUNTER — PATIENT OUTREACH (OUTPATIENT)
Dept: ADMINISTRATIVE | Facility: HOSPITAL | Age: 69
End: 2024-01-02
Payer: MEDICARE

## 2024-03-07 ENCOUNTER — TELEPHONE (OUTPATIENT)
Dept: FAMILY MEDICINE | Facility: CLINIC | Age: 69
End: 2024-03-07
Payer: MEDICARE

## 2024-03-07 NOTE — TELEPHONE ENCOUNTER
I spoke with the patient about this. Pt offered several appointments and patient declined. Pt will call back if needed

## 2024-03-07 NOTE — TELEPHONE ENCOUNTER
----- Message from Marc Hendricks sent at 3/7/2024  4:27 PM CST -----  Regarding: pt callback  Name of Who is Calling:Pt         What is the request in detail: Requesting callback in regards to new pt appt           Can the clinic reply by [facility]: no         What Number to [facility] Back [facility]:Telephone Information:  Mobile          [phone]